# Patient Record
Sex: FEMALE | Race: BLACK OR AFRICAN AMERICAN | Employment: UNEMPLOYED | URBAN - METROPOLITAN AREA
[De-identification: names, ages, dates, MRNs, and addresses within clinical notes are randomized per-mention and may not be internally consistent; named-entity substitution may affect disease eponyms.]

---

## 2019-11-19 ENCOUNTER — OFFICE VISIT (OUTPATIENT)
Dept: FAMILY MEDICINE CLINIC | Age: 39
End: 2019-11-19

## 2019-11-19 ENCOUNTER — HOSPITAL ENCOUNTER (OUTPATIENT)
Dept: LAB | Age: 39
Discharge: HOME | End: 2019-11-19

## 2019-11-19 VITALS
BODY MASS INDEX: 39.24 KG/M2 | HEIGHT: 67 IN | TEMPERATURE: 98.3 F | HEART RATE: 87 BPM | RESPIRATION RATE: 16 BRPM | SYSTOLIC BLOOD PRESSURE: 146 MMHG | WEIGHT: 250 LBS | OXYGEN SATURATION: 99 % | DIASTOLIC BLOOD PRESSURE: 98 MMHG

## 2019-11-19 DIAGNOSIS — D64.9 ANEMIA, UNSPECIFIED TYPE: ICD-10-CM

## 2019-11-19 DIAGNOSIS — I10 ESSENTIAL HYPERTENSION: ICD-10-CM

## 2019-11-19 DIAGNOSIS — E55.9 VITAMIN D DEFICIENCY: ICD-10-CM

## 2019-11-19 DIAGNOSIS — E78.5 HYPERLIPIDEMIA, UNSPECIFIED HYPERLIPIDEMIA TYPE: ICD-10-CM

## 2019-11-19 DIAGNOSIS — R73.01 ABNORMAL FASTING GLUCOSE: ICD-10-CM

## 2019-11-19 DIAGNOSIS — R53.83 FATIGUE, UNSPECIFIED TYPE: ICD-10-CM

## 2019-11-19 DIAGNOSIS — I10 ESSENTIAL HYPERTENSION: Primary | ICD-10-CM

## 2019-11-19 RX ORDER — AMLODIPINE BESYLATE 10 MG/1
TABLET ORAL
Qty: 90 TAB | Refills: 1 | Status: SHIPPED | OUTPATIENT
Start: 2019-11-19 | End: 2020-04-28 | Stop reason: SDUPTHER

## 2019-11-19 RX ORDER — AMLODIPINE BESYLATE 5 MG/1
TABLET ORAL
Refills: 3 | COMMUNITY
Start: 2019-11-10 | End: 2019-11-19 | Stop reason: SDUPTHER

## 2019-11-19 RX ORDER — METOPROLOL SUCCINATE 100 MG/1
TABLET, EXTENDED RELEASE ORAL
Qty: 90 TAB | Refills: 1 | Status: SHIPPED | OUTPATIENT
Start: 2019-11-19 | End: 2020-04-28 | Stop reason: SDUPTHER

## 2019-11-19 RX ORDER — METOPROLOL SUCCINATE 100 MG/1
TABLET, EXTENDED RELEASE ORAL
Refills: 0 | COMMUNITY
Start: 2019-10-23 | End: 2019-11-19 | Stop reason: SDUPTHER

## 2019-11-19 NOTE — PATIENT INSTRUCTIONS
Low Sodium Diet (2,000 Milligram): Care Instructions Your Care Instructions Too much sodium causes your body to hold on to extra water. This can raise your blood pressure and force your heart and kidneys to work harder. In very serious cases, this could cause you to be put in the hospital. It might even be life-threatening. By limiting sodium, you will feel better and lower your risk of serious problems. The most common source of sodium is salt. People get most of the salt in their diet from canned, prepared, and packaged foods. Fast food and restaurant meals also are very high in sodium. Your doctor will probably limit your sodium to less than 2,000 milligrams (mg) a day. This limit counts all the sodium in prepared and packaged foods and any salt you add to your food. Follow-up care is a key part of your treatment and safety. Be sure to make and go to all appointments, and call your doctor if you are having problems. It's also a good idea to know your test results and keep a list of the medicines you take. How can you care for yourself at home? Read food labels · Read labels on cans and food packages. The labels tell you how much sodium is in each serving. Make sure that you look at the serving size. If you eat more than the serving size, you have eaten more sodium. · Food labels also tell you the Percent Daily Value for sodium. Choose products with low Percent Daily Values for sodium. · Be aware that sodium can come in forms other than salt, including monosodium glutamate (MSG), sodium citrate, and sodium bicarbonate (baking soda). MSG is often added to Asian food. When you eat out, you can sometimes ask for food without MSG or added salt. Buy low-sodium foods · Buy foods that are labeled \"unsalted\" (no salt added), \"sodium-free\" (less than 5 mg of sodium per serving), or \"low-sodium\" (less than 140 mg of sodium per serving).  Foods labeled \"reduced-sodium\" and \"light sodium\" may still have too much sodium. Be sure to read the label to see how much sodium you are getting. · Buy fresh vegetables, or frozen vegetables without added sauces. Buy low-sodium versions of canned vegetables, soups, and other canned goods. Prepare low-sodium meals · Cut back on the amount of salt you use in cooking. This will help you adjust to the taste. Do not add salt after cooking. One teaspoon of salt has about 2,300 mg of sodium. · Take the salt shaker off the table. · Flavor your food with garlic, lemon juice, onion, vinegar, herbs, and spices. Do not use soy sauce, lite soy sauce, steak sauce, onion salt, garlic salt, celery salt, mustard, or ketchup on your food. · Use low-sodium salad dressings, sauces, and ketchup. Or make your own salad dressings and sauces without adding salt. · Use less salt (or none) when recipes call for it. You can often use half the salt a recipe calls for without losing flavor. Other foods such as rice, pasta, and grains do not need added salt. · Rinse canned vegetables, and cook them in fresh water. This removes somebut not allof the salt. · Avoid water that is naturally high in sodium or that has been treated with water softeners, which add sodium. Call your local water company to find out the sodium content of your water supply. If you buy bottled water, read the label and choose a sodium-free brand. Avoid high-sodium foods · Avoid eating: 
? Smoked, cured, salted, and canned meat, fish, and poultry. ? Ham, neal, hot dogs, and luncheon meats. ? Regular, hard, and processed cheese and regular peanut butter. ? Crackers with salted tops, and other salted snack foods such as pretzels, chips, and salted popcorn. ? Frozen prepared meals, unless labeled low-sodium. ? Canned and dried soups, broths, and bouillon, unless labeled sodium-free or low-sodium. ? Canned vegetables, unless labeled sodium-free or low-sodium. ? [place] fries, pizza, tacos, and other fast foods. ? Pickles, olives, ketchup, and other condiments, especially soy sauce, unless labeled sodium-free or low-sodium. Where can you learn more? Go to http://jessica-nell.info/. Enter L251 in the search box to learn more about \"Low Sodium Diet (2,000 Milligram): Care Instructions. \" Current as of: November 7, 2018 Content Version: 12.2 © 7940-7927 [employer]. Care instructions adapted under license by [employer] (which disclaims liability or warranty for this information). If you have questions about a medical condition or this instruction, always ask your healthcare professional. [address] any warranty or liability for your use of this information.

## 2019-11-19 NOTE — PROGRESS NOTES
Jason Juarez is a 45 y.o. female who presents today with the following:    HPI  Chief Complaint   Patient presents with   [address]     Pt requesting labs       1. Essential hypertension    HTN  The patient presents today for HTN follow-up. Taking medications daily w/o complications. Home BP readings range from 801U systolic. SIde effects of meds: None  Patient trying to follow low salt diet. Lab Results   Component Value Date/Time    Sodium 136 08/09/2010 11:10 AM    Potassium 4.2 08/09/2010 11:10 AM    Chloride 104 08/09/2010 11:10 AM    CO2 23 08/09/2010 11:10 AM    Anion gap 9 08/09/2010 11:10 AM    Glucose 86 08/09/2010 11:10 AM    BUN 12 08/09/2010 11:10 AM    Creatinine 0.8 08/09/2010 11:10 AM    BUN/Creatinine ratio 15 08/09/2010 11:10 AM    GFR est AA >60 08/09/2010 11:10 AM    GFR est non-AA >60 08/09/2010 11:10 AM    Calcium 8.9 08/09/2010 11:10 AM     No results found for: MCACR, MCA1, MCA2, MCA3, MCAU, MCAU2, MCALPOCT               Review of Systems   Constitutional: Positive for malaise/fatigue. HENT: Negative. Eyes: Negative. Respiratory: Negative. Cardiovascular: Negative. Gastrointestinal: Negative. Genitourinary: Negative. Musculoskeletal: Negative. Skin: Negative. Neurological: Negative. Endo/Heme/Allergies: Negative. Psychiatric/Behavioral: Negative. Physical Exam   Constitutional: She is oriented to person, place, and time and well-developed, well-nourished, and in no distress. HENT:   Head: Normocephalic and atraumatic. Right Ear: External ear normal.   Left Ear: External ear normal.   Nose: Nose normal.   Mouth/Throat: No oropharyngeal exudate. Eyes: Conjunctivae are normal.   Neck: Normal range of motion. Neck supple. No thyromegaly present. Cardiovascular: Normal rate and regular rhythm. Pulmonary/Chest: Effort normal and breath sounds normal.   Abdominal: Soft. Bowel sounds are normal. She exhibits no distension.  There is no tenderness. Musculoskeletal: Normal range of motion. She exhibits no edema. Lymphadenopathy:     She has no cervical adenopathy. Neurological: She is alert and oriented to person, place, and time. Skin: Skin is warm and dry. Psychiatric: Mood and affect normal.   Nursing note and vitals reviewed. BP (!) 146/98   Pulse 87   Temp 98.3 °F (36.8 °C) (Oral)   Resp 16   Ht 5' 7\" (1.702 m)   Wt 250 lb (113.4 kg)   SpO2 99%   BMI 39.16 kg/m²     No Known Allergies    Current Outpatient Medications   Medication Sig    amLODIPine (NORVASC) 10 mg tablet TAKE 1 TABLET BY MOUTH DAILY    metoprolol succinate (TOPROL-XL) 100 mg tablet TAKE 1 TABLET BY MOUTH EVERY DAY    aspirin 81 mg chewable tablet Take 81 mg by mouth daily.  prenatal multivit-ca-min-fe-fa tab Take  by mouth. No current facility-administered medications for this visit. Past Medical History:   Diagnosis Date    Gestational diabetes     Hypertension        Past Surgical History:   Procedure Laterality Date    HX  SECTION         Problem List  Date Reviewed: 2019    None           No results found for this visit on 19.      1. Essential hypertension  Ms. John Koehler has been given the following recommendations today due to her elevated BP reading: rescreen BP within a minimum of 2 weeks and lifestyle modifications to include: dietary sodium restriction. - amLODIPine (NORVASC) 10 mg tablet; TAKE 1 TABLET BY MOUTH DAILY  Dispense: 90 Tab; Refill: 1  - metoprolol succinate (TOPROL-XL) 100 mg tablet; TAKE 1 TABLET BY MOUTH EVERY DAY  Dispense: 90 Tab; Refill: 1  - TSH 3RD GENERATION; Future  - URINALYSIS W/ RFLX MICROSCOPIC; Future  - METABOLIC PANEL, COMPREHENSIVE; Future    2. Abnormal fasting glucose    - HEMOGLOBIN A1C WITH EAG; Future    3. Hyperlipidemia, unspecified hyperlipidemia type    - LIPID PANEL; Future    4. Fatigue, unspecified type    - CBC WITH AUTOMATED DIFF;  Future  - IRON PROFILE; Future  - TSH 3RD GENERATION; Future  - URINALYSIS W/ RFLX MICROSCOPIC; Future  - METABOLIC PANEL, COMPREHENSIVE; Future    5. Anemia, unspecified type    - CBC WITH AUTOMATED DIFF; Future  - IRON PROFILE; Future    6. Vitamin D deficiency    - VITAMIN D, 25 HYDROXY; Future        Follow-up and Dispositions    · Return in about 2 weeks (around 12/3/2019) for follow up, BP. I have discussed the diagnosis with the patient and the intended plan as seen in the above orders. The patient has received an after-visit summary and questions were answered concerning future plans. I have discussed medication side effects and warnings with the patient as well. The patient agrees and understands above plan.            Samara Peña MD

## 2019-11-19 NOTE — PROGRESS NOTES
Identified pt with two pt identifiers(name and ). Reviewed record in preparation for visit and have obtained necessary documentation. Chief Complaint   Patient presents with   [facility] Establish Care    Labs     Pt requesting labs        Health Maintenance Due   Topic    DTaP/Tdap/Td series (1 - Tdap)    PAP AKA CERVICAL CYTOLOGY     Influenza Age 5 to Adult    -Pt declines flu shot    Coordination of Care Questionnaire:  :   1) Have you been to an emergency room, urgent care, or hospitalized since your last visit? If yes, where when, and reason for visit? no      2. Have seen or consulted any other health care provider since your last visit? If yes, where when, and reason for visit?  no        Patient is accompanied by self  I have received verbal consent from Emma Ham to discuss any/all medical information while they are present in the room.

## 2019-11-20 DIAGNOSIS — D64.9 ANEMIA, UNSPECIFIED TYPE: ICD-10-CM

## 2019-11-20 DIAGNOSIS — E55.9 VITAMIN D DEFICIENCY: ICD-10-CM

## 2019-11-20 DIAGNOSIS — N39.0 URINARY TRACT INFECTION WITH HEMATURIA, SITE UNSPECIFIED: ICD-10-CM

## 2019-11-20 DIAGNOSIS — E78.5 HYPERLIPIDEMIA, UNSPECIFIED HYPERLIPIDEMIA TYPE: Primary | ICD-10-CM

## 2019-11-20 DIAGNOSIS — R31.9 URINARY TRACT INFECTION WITH HEMATURIA, SITE UNSPECIFIED: ICD-10-CM

## 2019-11-20 LAB
25(OH)D3 SERPL-MCNC: 12.9 NG/ML (ref 30–100)
ALBUMIN SERPL-MCNC: 3.5 G/DL (ref 3.5–5)
ALBUMIN/GLOB SERPL: 0.8 {RATIO} (ref 1.1–2.2)
ALP SERPL-CCNC: 107 U/L (ref 45–117)
ALT SERPL-CCNC: 21 U/L (ref 12–78)
ANION GAP SERPL CALC-SCNC: 6 MMOL/L (ref 5–15)
APPEARANCE UR: ABNORMAL
AST SERPL-CCNC: 18 U/L (ref 15–37)
BACTERIA URNS QL MICRO: ABNORMAL /HPF
BASOPHILS # BLD: 0 K/UL (ref 0–0.1)
BASOPHILS NFR BLD: 1 % (ref 0–1)
BILIRUB SERPL-MCNC: 0.4 MG/DL (ref 0.2–1)
BILIRUB UR QL: NEGATIVE
BUN SERPL-MCNC: 10 MG/DL (ref 6–20)
BUN/CREAT SERPL: 15 (ref 12–20)
CALCIUM SERPL-MCNC: 8.8 MG/DL (ref 8.5–10.1)
CAOX CRY URNS QL MICRO: ABNORMAL
CHLORIDE SERPL-SCNC: 105 MMOL/L (ref 97–108)
CHOLEST SERPL-MCNC: 204 MG/DL
CO2 SERPL-SCNC: 28 MMOL/L (ref 21–32)
COLOR UR: ABNORMAL
CREAT SERPL-MCNC: 0.67 MG/DL (ref 0.55–1.02)
DIFFERENTIAL METHOD BLD: ABNORMAL
EOSINOPHIL # BLD: 0.3 K/UL (ref 0–0.4)
EOSINOPHIL NFR BLD: 4 % (ref 0–7)
EPITH CASTS URNS QL MICRO: ABNORMAL /LPF
ERYTHROCYTE [DISTWIDTH] IN BLOOD BY AUTOMATED COUNT: 15.9 % (ref 11.5–14.5)
EST. AVERAGE GLUCOSE BLD GHB EST-MCNC: 137 MG/DL
GLOBULIN SER CALC-MCNC: 4.3 G/DL (ref 2–4)
GLUCOSE SERPL-MCNC: 109 MG/DL (ref 65–100)
GLUCOSE UR STRIP.AUTO-MCNC: NEGATIVE MG/DL
HBA1C MFR BLD: 6.4 % (ref 4–5.6)
HCT VFR BLD AUTO: 32.2 % (ref 35–47)
HDLC SERPL-MCNC: 42 MG/DL
HDLC SERPL: 4.9 {RATIO} (ref 0–5)
HGB BLD-MCNC: 9.6 G/DL (ref 11.5–16)
HGB UR QL STRIP: ABNORMAL
IMM GRANULOCYTES # BLD AUTO: 0 K/UL (ref 0–0.04)
IMM GRANULOCYTES NFR BLD AUTO: 0 % (ref 0–0.5)
IRON SATN MFR SERPL: 8 % (ref 20–50)
IRON SERPL-MCNC: 34 UG/DL (ref 35–150)
KETONES UR QL STRIP.AUTO: ABNORMAL MG/DL
LDLC SERPL CALC-MCNC: 143.8 MG/DL (ref 0–100)
LEUKOCYTE ESTERASE UR QL STRIP.AUTO: ABNORMAL
LIPID PROFILE,FLP: ABNORMAL
LYMPHOCYTES # BLD: 2.2 K/UL (ref 0.8–3.5)
LYMPHOCYTES NFR BLD: 34 % (ref 12–49)
MCH RBC QN AUTO: 24.9 PG (ref 26–34)
MCHC RBC AUTO-ENTMCNC: 29.8 G/DL (ref 30–36.5)
MCV RBC AUTO: 83.6 FL (ref 80–99)
MONOCYTES # BLD: 0.4 K/UL (ref 0–1)
MONOCYTES NFR BLD: 7 % (ref 5–13)
NEUTS SEG # BLD: 3.5 K/UL (ref 1.8–8)
NEUTS SEG NFR BLD: 54 % (ref 32–75)
NITRITE UR QL STRIP.AUTO: NEGATIVE
NRBC # BLD: 0 K/UL (ref 0–0.01)
NRBC BLD-RTO: 0 PER 100 WBC
PH UR STRIP: 6.5 [PH] (ref 5–8)
PLATELET # BLD AUTO: 467 K/UL (ref 150–400)
PMV BLD AUTO: 9.8 FL (ref 8.9–12.9)
POTASSIUM SERPL-SCNC: 3.5 MMOL/L (ref 3.5–5.1)
PROT SERPL-MCNC: 7.8 G/DL (ref 6.4–8.2)
PROT UR STRIP-MCNC: 30 MG/DL
RBC # BLD AUTO: 3.85 M/UL (ref 3.8–5.2)
RBC #/AREA URNS HPF: ABNORMAL /HPF (ref 0–5)
SODIUM SERPL-SCNC: 139 MMOL/L (ref 136–145)
SP GR UR REFRACTOMETRY: 1.02 (ref 1–1.03)
TIBC SERPL-MCNC: 446 UG/DL (ref 250–450)
TRIGL SERPL-MCNC: 91 MG/DL (ref ?–150)
TSH SERPL DL<=0.05 MIU/L-ACNC: 0.53 UIU/ML (ref 0.36–3.74)
UROBILINOGEN UR QL STRIP.AUTO: 1 EU/DL (ref 0.2–1)
VLDLC SERPL CALC-MCNC: 18.2 MG/DL
WBC # BLD AUTO: 6.3 K/UL (ref 3.6–11)
WBC URNS QL MICRO: ABNORMAL /HPF (ref 0–4)

## 2019-11-20 RX ORDER — NITROFURANTOIN 25; 75 MG/1; MG/1
100 CAPSULE ORAL 2 TIMES DAILY
Qty: 20 CAP | Refills: 0 | Status: SHIPPED | OUTPATIENT
Start: 2019-11-20 | End: 2019-12-05

## 2019-11-20 RX ORDER — ERGOCALCIFEROL 1.25 MG/1
50000 CAPSULE ORAL
Qty: 12 CAP | Refills: 5 | Status: SHIPPED | OUTPATIENT
Start: 2019-11-20 | End: 2020-04-28 | Stop reason: SDUPTHER

## 2019-11-20 RX ORDER — LANOLIN ALCOHOL/MO/W.PET/CERES
325 CREAM (GRAM) TOPICAL
Qty: 90 TAB | Refills: 5 | Status: SHIPPED | OUTPATIENT
Start: 2019-11-20 | End: 2020-04-28 | Stop reason: SDUPTHER

## 2019-11-20 RX ORDER — ATORVASTATIN CALCIUM 10 MG/1
10 TABLET, FILM COATED ORAL DAILY
Qty: 90 TAB | Refills: 1 | Status: SHIPPED | OUTPATIENT
Start: 2019-11-20 | End: 2019-12-05 | Stop reason: SDUPTHER

## 2019-11-20 NOTE — PROGRESS NOTES
Please call patient, inform, I have faxed rx for cholesterol med,  iron, vitamin d , & antibiotic for uti , to pharmacy on chart. Patient to start taking it & follow up as advised.

## 2019-12-05 ENCOUNTER — OFFICE VISIT (OUTPATIENT)
Dept: FAMILY MEDICINE CLINIC | Age: 39
End: 2019-12-05

## 2019-12-05 VITALS
HEIGHT: 67 IN | WEIGHT: 254 LBS | OXYGEN SATURATION: 98 % | BODY MASS INDEX: 39.87 KG/M2 | TEMPERATURE: 99.7 F | RESPIRATION RATE: 16 BRPM | HEART RATE: 72 BPM | DIASTOLIC BLOOD PRESSURE: 88 MMHG | SYSTOLIC BLOOD PRESSURE: 149 MMHG

## 2019-12-05 DIAGNOSIS — E78.5 HYPERLIPIDEMIA, UNSPECIFIED HYPERLIPIDEMIA TYPE: ICD-10-CM

## 2019-12-05 DIAGNOSIS — I10 ESSENTIAL HYPERTENSION: Primary | ICD-10-CM

## 2019-12-05 DIAGNOSIS — E66.01 SEVERE OBESITY (HCC): ICD-10-CM

## 2019-12-05 RX ORDER — ATORVASTATIN CALCIUM 10 MG/1
10 TABLET, FILM COATED ORAL DAILY
Qty: 90 TAB | Refills: 1 | Status: SHIPPED | OUTPATIENT
Start: 2019-12-05 | End: 2020-04-28 | Stop reason: SDUPTHER

## 2019-12-05 RX ORDER — LISINOPRIL AND HYDROCHLOROTHIAZIDE 10; 12.5 MG/1; MG/1
1 TABLET ORAL DAILY
Qty: 90 TAB | Refills: 1 | Status: SHIPPED | OUTPATIENT
Start: 2019-12-05 | End: 2020-04-28 | Stop reason: SDUPTHER

## 2019-12-05 NOTE — PATIENT INSTRUCTIONS
DASH Diet: Care Instructions Your Care Instructions The DASH diet is an eating plan that can help lower your blood pressure. DASH stands for Dietary Approaches to Stop Hypertension. Hypertension is high blood pressure. The DASH diet focuses on eating foods that are high in calcium, potassium, and magnesium. These nutrients can lower blood pressure. The foods that are highest in these nutrients are fruits, vegetables, low-fat dairy products, nuts, seeds, and legumes. But taking calcium, potassium, and magnesium supplements instead of eating foods that are high in those nutrients does not have the same effect. The DASH diet also includes whole grains, fish, and poultry. The DASH diet is one of several lifestyle changes your doctor may recommend to lower your high blood pressure. Your doctor may also want you to decrease the amount of sodium in your diet. Lowering sodium while following the DASH diet can lower blood pressure even further than just the DASH diet alone. Follow-up care is a key part of your treatment and safety. Be sure to make and go to all appointments, and call your doctor if you are having problems. It's also a good idea to know your test results and keep a list of the medicines you take. How can you care for yourself at home? Following the DASH diet · Eat 4 to 5 servings of fruit each day. A serving is 1 medium-sized piece of fruit, ½ cup chopped or canned fruit, 1/4 cup dried fruit, or 4 ounces (½ cup) of fruit juice. Choose fruit more often than fruit juice. · Eat 4 to 5 servings of vegetables each day. A serving is 1 cup of lettuce or raw leafy vegetables, ½ cup of chopped or cooked vegetables, or 4 ounces (½ cup) of vegetable juice. Choose vegetables more often than vegetable juice. · Get 2 to 3 servings of low-fat and fat-free dairy each day. A serving is 8 ounces of milk, 1 cup of yogurt, or 1 ½ ounces of cheese. · Eat 6 to 8 servings of grains each day. A serving is 1 slice of bread, 1 ounce of dry cereal, or ½ cup of cooked rice, pasta, or cooked cereal. Try to choose whole-grain products as much as possible. · Limit lean meat, poultry, and fish to 2 servings each day. A serving is 3 ounces, about the size of a deck of cards. · Eat 4 to 5 servings of nuts, seeds, and legumes (cooked dried beans, lentils, and split peas) each week. A serving is 1/3 cup of nuts, 2 tablespoons of seeds, or ½ cup of cooked beans or peas. · Limit fats and oils to 2 to 3 servings each day. A serving is 1 teaspoon of vegetable oil or 2 tablespoons of salad dressing. · Limit sweets and added sugars to 5 servings or less a week. A serving is 1 tablespoon jelly or jam, ½ cup sorbet, or 1 cup of lemonade. · Eat less than 2,300 milligrams (mg) of sodium a day. If you limit your sodium to 1,500 mg a day, you can lower your blood pressure even more. Tips for success · Start small. Do not try to make dramatic changes to your diet all at once. You might feel that you are missing out on your favorite foods and then be more likely to not follow the plan. Make small changes, and stick with them. Once those changes become habit, add a few more changes. · Try some of the following: ? Make it a goal to eat a fruit or vegetable at every meal and at snacks. This will make it easy to get the recommended amount of fruits and vegetables each day. ? Try yogurt topped with fruit and nuts for a snack or healthy dessert. ? Add lettuce, tomato, cucumber, and onion to sandwiches. ? Combine a ready-made pizza crust with low-fat mozzarella cheese and lots of vegetable toppings. Try using tomatoes, squash, spinach, broccoli, carrots, cauliflower, and onions. ? Have a variety of cut-up vegetables with a low-fat dip as an appetizer instead of chips and dip. ? Sprinkle sunflower seeds or chopped almonds over salads.  Or try adding chopped walnuts or almonds to cooked vegetables. ? Try some vegetarian meals using beans and peas. Add garbanzo or kidney beans to salads. Make burritos and tacos with mashed gaona beans or black beans. Where can you learn more? Go to http://jessica-nell.info/. Enter A823 in the search box to learn more about \"DASH Diet: Care Instructions. \" Current as of: April 9, 2019 Content Version: 12.2 © 4849-4949 [employer]. Care instructions adapted under license by [employer] (which disclaims liability or warranty for this information). If you have questions about a medical condition or this instruction, always ask your healthcare professional. [address] any warranty or liability for your use of this information. Learning About High Cholesterol What is high cholesterol? Cholesterol is a type of fat in your blood. It is needed for many body functions, such as making new cells. Cholesterol is made by your body. It also comes from food you eat. If you have too much cholesterol, it starts to build up in your arteries. This is called hardening of the arteries, or atherosclerosis. High cholesterol raises your risk of a heart attack and stroke. There are different types of cholesterol. LDL is the \"bad\" cholesterol. High LDL can raise your risk for heart disease, heart attack, and stroke. HDL is the \"good\" cholesterol. High HDL is linked with a lower risk for heart disease, heart attack, and stroke. Your cholesterol levels help your doctor find out your risk for having a heart attack or stroke. How can you prevent high cholesterol? A heart-healthy lifestyle can help you prevent high cholesterol. This lifestyle helps lower your risk for a heart attack and stroke. · Eat heart-healthy foods.  
? Eat fruits, vegetables, whole grains (like oatmeal), dried beans and peas, nuts and seeds, soy products (like tofu), and fat-free or low-fat dairy products. ? Replace butter, margarine, and hydrogenated or partially hydrogenated oils with olive and canola oils. (Canola oil margarine without trans fat is fine.) ? Replace red meat with fish, poultry, and soy protein (like tofu). ? Limit processed and packaged foods like chips, crackers, and cookies. · Be active. Exercise can improve your cholesterol level. Get at least 30 minutes of exercise on most days of the week. Walking is a good choice. You also may want to do other activities, such as running, swimming, cycling, or playing tennis or team sports. · Stay at a healthy weight. Lose weight if you need to. · Don't smoke. If you need help quitting, talk to your doctor about stop-smoking programs and medicines. These can increase your chances of quitting for good. How is high cholesterol treated? The goal of treatment is to reduce your chances of having a heart attack or stroke. The goal is not to lower your cholesterol numbers only. · You may make lifestyle changes, such as eating healthy foods, not smoking, losing weight, and being more active. · You may have to take medicine. Follow-up care is a key part of your treatment and safety. Be sure to make and go to all appointments, and call your doctor if you are having problems. It's also a good idea to know your test results and keep a list of the medicines you take. Where can you learn more? Go to http://jessica-nell.info/. Enter N691 in the search box to learn more about \"Learning About High Cholesterol. \" Current as of: April 9, 2019 Content Version: 12.2 © 8857-4751 [employer]. Care instructions adapted under license by [employer] (which disclaims liability or warranty for this information). If you have questions about a medical condition or this instruction, always ask your healthcare professional. [address] any warranty or liability for your use of this information. Learning About Low-Carbohydrate Diets for Weight Loss What is a low-carbohydrate diet? Low-carb diets avoid foods that are high in carbohydrate. These high-carb foods include pasta, bread, rice, cereal, fruits, and starchy vegetables. Instead, these diets usually have you eat foods that are high in fat and protein. Many people lose weight quickly on a low-carb diet. But the early weight loss is water. People on this diet often gain the weight back after they start eating carbs again. Not all diet plans are safe or work well. A lot of the evidence shows that low-carb diets aren't healthy. That's because these diets often don't include healthy foods like fruits and vegetables. Losing weight safely means balancing protein, fat, and carbs with every meal and snack. And low-carb diets don't always provide the vitamins, minerals, and fiber you need. If you have a serious medical condition, talk to your doctor before you try any diet. These conditions include kidney disease, heart disease, type 2 diabetes, high cholesterol, and high blood pressure. If you are pregnant, it may not be safe for your baby if you are on a low-carb diet. How can you lose weight safely? You might have heard that a diet plan helped another person lose weight. But that doesn't mean that it will work for you. It is very hard to stay on a diet that includes lots of big changes in your eating habits. If you want to get to a healthy weight and stay there, making healthy lifestyle changes will often work better than dieting. These steps can help. · Make a plan for change. Work with your doctor to create a plan that is right for you. · See a dietitian. He or she can show you how to make healthy changes in your eating habits. · Manage stress. If you have a lot of stress in your life, it can be hard to focus on making healthy changes to your daily habits. · Track your food and activity.  You are likely to do better at losing weight if you keep track of what you eat and what you do. Follow-up care is a key part of your treatment and safety. Be sure to make and go to all appointments, and call your doctor if you are having problems. It's also a good idea to know your test results and keep a list of the medicines you take. Where can you learn more? Go to http://jessica-nell.info/. Enter A121 in the search box to learn more about \"Learning About Low-Carbohydrate Diets for Weight Loss. \" Current as of: November 7, 2018 Content Version: 12.2 © 8234-5136 [employer]. Care instructions adapted under license by [employer] (which disclaims liability or warranty for this information). If you have questions about a medical condition or this instruction, always ask your healthcare professional. [address] any warranty or liability for your use of this information.

## 2019-12-05 NOTE — PROGRESS NOTES
Arina Enamorado is a 44 y.o. female who presents today with the following:    HPI  Chief Complaint   Patient presents with    Labs     This roxana follow-up visit        1. Severe obesity ([address]  Counseled patient about weight loss. Gave written information of low-fat, low carbohydrate diet. Recommended exercise. 2. Hyperlipidemia, unspecified hyperlipidemia type  HLD  Patient presents today for hyperlipidemia. Patient currently taking not taking Lipitor although it was faxed to her pharmacy. Taking medication as prescribed without side effects. Trying to follow a low cholesterol diet. Exercising does not exercise  Current medications to control lipids: None  Skips doses of meds: None    Lab Results   Component Value Date/Time    Cholesterol, total 204 (H) 11/19/2019 11:27 AM    HDL Cholesterol 42 11/19/2019 11:27 AM    LDL, calculated 143.8 (H) 11/19/2019 11:27 AM    VLDL, calculated 18.2 11/19/2019 11:27 AM    Triglyceride 91 11/19/2019 11:27 AM    CHOL/HDL Ratio 4.9 11/19/2019 11:27 AM         3. Essential hypertension  Patient is here with her blood pressure log. It shows the patient ranges 170s 150s in the past 2 weeks. Patient reports taking all her medications regularly as prescribed. She does not follow a low-salt diet. She does not exercise. Patient does not have any side effects from current medications. Review of Systems   Constitutional: Positive for malaise/fatigue. HENT: Negative. Eyes: Negative. Respiratory: Negative. Cardiovascular: Negative. Gastrointestinal: Negative. Genitourinary: Negative. Musculoskeletal: Negative. Skin: Negative. Neurological: Negative. Endo/Heme/Allergies: Negative. Psychiatric/Behavioral: Negative. Physical Exam  Vitals signs and nursing note reviewed. HENT:      Head: Normocephalic and atraumatic.       Right Ear: External ear normal.      Left Ear: External ear normal.      Nose: Nose normal.      Mouth/Throat: Pharynx: No oropharyngeal exudate. Eyes:      Conjunctiva/sclera: Conjunctivae normal.   Neck:      Musculoskeletal: Normal range of motion and neck supple. Thyroid: No thyromegaly. Cardiovascular:      Rate and Rhythm: Normal rate and regular rhythm. Pulmonary:      Effort: Pulmonary effort is normal.      Breath sounds: Normal breath sounds. Abdominal:      General: Bowel sounds are normal. There is no distension. Palpations: Abdomen is soft. Tenderness: There is no tenderness. Musculoskeletal: Normal range of motion. Lymphadenopathy:      Cervical: No cervical adenopathy. Skin:     General: Skin is warm and dry. Neurological:      Mental Status: She is alert and oriented to person, place, and time. Psychiatric:         Mood and Affect: Mood and affect normal.       /88   Pulse 72   Temp 99.7 °F (37.6 °C) (Oral)   Resp 16   Ht 5' 7\" (1.702 m)   Wt 254 lb (115.2 kg)   LMP 2019   SpO2 98%   Breastfeeding Unknown   BMI 39.78 kg/m²     No Known Allergies    Current Outpatient Medications   Medication Sig    atorvastatin (LIPITOR) 10 mg tablet Take 1 Tab by mouth daily.  lisinopril-hydroCHLOROthiazide (PRINZIDE, ZESTORETIC) 10-12.5 mg per tablet Take 1 Tab by mouth daily.  ergocalciferol (ERGOCALCIFEROL) 50,000 unit capsule Take 1 Cap by mouth every seven (7) days.  ferrous sulfate 325 mg (65 mg iron) tablet Take 1 Tab by mouth three (3) times daily (with meals).  amLODIPine (NORVASC) 10 mg tablet TAKE 1 TABLET BY MOUTH DAILY    metoprolol succinate (TOPROL-XL) 100 mg tablet TAKE 1 TABLET BY MOUTH EVERY DAY    aspirin 81 mg chewable tablet Take 81 mg by mouth daily.  prenatal multivit-ca-min-fe-fa tab Take  by mouth. No current facility-administered medications for this visit.         Past Medical History:   Diagnosis Date    Gestational diabetes     Hypertension        Past Surgical History:   Procedure Laterality Date    HX  SECTION Problem List  Date Reviewed: 12/5/2019          Codes Class Noted    Severe obesity ([facility] ICD-10-CM: E66.01  ICD-9-CM: 278.01  12/5/2019               No results found for this visit on 12/05/19. 1. Severe obesity ([facility]  I have reviewed/discussed the above normal BMI with the patient and spouse. I have recommended the following interventions: dietary management education, guidance, and counseling, encourage exercise, monitor weight and prescribed dietary intake . Arya Muhammad 2. Hyperlipidemia, unspecified hyperlipidemia type  Patient has not started Lipitor yet. Prescription printed and given again  - atorvastatin (LIPITOR) 10 mg tablet; Take 1 Tab by mouth daily. Dispense: 90 Tab; Refill: 1    3. Essential hypertension  Start lisinopril HCTZ. Ms. Jose Dalton has been given the following recommendations today due to her elevated BP reading: rescreen BP within a minimum of 2 weeks, lifestyle modifications to include: weight reduction, DASH eating plan, dietary sodium restriction and increase physical activity and anti-hypertensive pharmacologic therapy ordered. - lisinopril-hydroCHLOROthiazide (PRINZIDE, ZESTORETIC) 10-12.5 mg per tablet; Take 1 Tab by mouth daily. Dispense: 90 Tab; Refill: 1        Follow-up and Dispositions    · Return in about 1 month (around 1/5/2020) for follow up. I have discussed the diagnosis with the patient and the intended plan as seen in the above orders. The patient has received an after-visit summary and questions were answered concerning future plans. I have discussed medication side effects and warnings with the patient as well. The patient agrees and understands above plan.            Justyn Howell MD

## 2020-04-28 DIAGNOSIS — I10 ESSENTIAL HYPERTENSION: ICD-10-CM

## 2020-04-28 DIAGNOSIS — E78.5 HYPERLIPIDEMIA, UNSPECIFIED HYPERLIPIDEMIA TYPE: ICD-10-CM

## 2020-04-28 DIAGNOSIS — D64.9 ANEMIA, UNSPECIFIED TYPE: ICD-10-CM

## 2020-04-28 DIAGNOSIS — E55.9 VITAMIN D DEFICIENCY: ICD-10-CM

## 2020-04-29 RX ORDER — METOPROLOL SUCCINATE 100 MG/1
TABLET, EXTENDED RELEASE ORAL
Qty: 90 TAB | Refills: 1 | Status: SHIPPED | OUTPATIENT
Start: 2020-04-29

## 2020-04-29 RX ORDER — ATORVASTATIN CALCIUM 10 MG/1
10 TABLET, FILM COATED ORAL DAILY
Qty: 90 TAB | Refills: 1 | Status: SHIPPED | OUTPATIENT
Start: 2020-04-29 | End: 2020-10-12

## 2020-04-29 RX ORDER — LANOLIN ALCOHOL/MO/W.PET/CERES
325 CREAM (GRAM) TOPICAL
Qty: 90 TAB | Refills: 5 | Status: SHIPPED | OUTPATIENT
Start: 2020-04-29 | End: 2020-12-21

## 2020-04-29 RX ORDER — ERGOCALCIFEROL 1.25 MG/1
50000 CAPSULE ORAL
Qty: 12 CAP | Refills: 5 | Status: SHIPPED | OUTPATIENT
Start: 2020-04-29

## 2020-04-29 RX ORDER — LISINOPRIL AND HYDROCHLOROTHIAZIDE 10; 12.5 MG/1; MG/1
1 TABLET ORAL DAILY
Qty: 90 TAB | Refills: 1 | Status: SHIPPED | OUTPATIENT
Start: 2020-04-29

## 2020-04-29 RX ORDER — AMLODIPINE BESYLATE 10 MG/1
TABLET ORAL
Qty: 90 TAB | Refills: 1 | Status: SHIPPED | OUTPATIENT
Start: 2020-04-29

## 2020-11-27 PROBLEM — I10 ESSENTIAL HYPERTENSION: Status: ACTIVE | Noted: 2020-11-27

## 2020-11-27 PROBLEM — E55.9 VITAMIN D DEFICIENCY: Status: ACTIVE | Noted: 2020-11-27

## 2020-11-27 PROBLEM — D50.9 IRON DEFICIENCY ANEMIA: Status: ACTIVE | Noted: 2020-11-27

## 2020-11-27 PROBLEM — E78.2 MIXED HYPERLIPIDEMIA: Status: ACTIVE | Noted: 2020-11-27

## 2020-11-27 PROBLEM — R73.01 ABNORMAL FASTING GLUCOSE: Status: ACTIVE | Noted: 2020-11-27

## 2020-12-19 DIAGNOSIS — D64.9 ANEMIA, UNSPECIFIED TYPE: ICD-10-CM

## 2020-12-21 RX ORDER — LANOLIN ALCOHOL/MO/W.PET/CERES
CREAM (GRAM) TOPICAL
Qty: 270 TAB | Refills: 1 | Status: SHIPPED | OUTPATIENT
Start: 2020-12-21

## 2021-07-02 ENCOUNTER — VIRTUAL VISIT (OUTPATIENT)
Dept: DIABETES SERVICES | Age: 41
End: 2021-07-02
Payer: MEDICAID

## 2021-07-02 DIAGNOSIS — E11.9 TYPE 2 DIABETES MELLITUS WITHOUT COMPLICATION, UNSPECIFIED WHETHER LONG TERM INSULIN USE (HCC): Primary | ICD-10-CM

## 2021-07-02 PROCEDURE — G0108 DIAB MANAGE TRN  PER INDIV: HCPCS

## 2021-07-02 NOTE — PROGRESS NOTES
[facility]  Diabetes Self-Management Education & Support Program  Pre-program Assessment    Reason for Referral:   Referral Source: SINDY Scott*  Services requested: DSMES    ASSESSMENT    From my perspective, the participant would benefit from [facility] specifically related to Reducing risks, Healthy eating, Monitoring, Physical activity, Taking medications, Healthy coping and Problem solving. Will adapt DSMES program to build on participant's skills score, confidence score and preparedness score as noted in the Diabetes Skills, Confidence, and Preparedness Index. During the program, we will focus on providing DSMES that specifically addresses participant's interest in Healthy eating and Monitoring, as shown by their reported readiness to change. The participant would be best served by attending weekly group class series. Diabetes Self-Management Education Follow-up Visit: 7/8/21 at 3:00 PM       Clinical Presentation  Krish Lópze is a 36 y.o.  female referred for diabetes self-management education. Participant has Type 2 DM not on insulin for 1-10 years. Family history positive for diabetes. Patient reports not receiving DSMES services in the past. Most recent A1c value:   Lab Results   Component Value Date/Time    Hemoglobin A1c 6.4 (H) 11/19/2019 11:27 AM     Diabetes-related medications:  Current dosing:   Key Antihyperglycemic Medications     Patient is on no antihyperglycemic meds. Blood Pressure Management  Key ACE/ARB Medications             lisinopril-hydroCHLOROthiazide (PRINZIDE, ZESTORETIC) 10-12.5 mg per tablet Take 1 Tab by mouth daily. Lipid Management  Key Antihyperlipidemia Meds             atorvastatin (LIPITOR) 10 mg tablet TAKE 1 TABLET BY MOUTH EVERY DAY          Clot Prevention  Key Anti-Platelet Anticoagulant Meds             aspirin 81 mg chewable tablet Take 81 mg by mouth daily.           Learning Assessment  Learning objectives Educator assessment (7/2/2021)   Diabetes Disease Process  The participant can   A) describe diabetes in basic terms;   B) state the type of diabetes they have; &   C) state accepted blood glucose targets. Healthy Eating  The participant can   A) identify carbohydrate foods; &   B) accurately read food labels. Being Active  The participant can  A) state the benefits of physical activity;  B) report their current PA practices;  C) identify PA they would consider incorporating in their lives; &  D) develop an implementation plan. Monitoring  The participant can  A) operate their blood glucose meter; &  B) describe how they log their blood glucoses to share with their provider. Taking Medications  The participant can  A) name their diabetes medications;  B) state the purpose and dose;  C) note side effects; &  D) describe proper storage, disposal & transport (if appropriate). Healthy Coping  The participant can    A) describe their response to diabetes diagnosis; B) describe their specific coping mechanisms;  C) identify supportive people and/or other resources that positively support their diabetes self-care and health. Reducing Risks  The participant can describe the preventive measures used by providers to promote health and prevent diabetes complications. Problem Solving  The participant can   A) identify signs, symptoms & treatment of hypoglycemia;   B) identify signs, symptoms & treatment of hyperglycemia;  C) describe their sick day plan; &  D) identify BG patterns to discuss with their provider.        No  No  No        Yes  No        No  Yes  No  No        Yes  Yes          Yes  Yes  Yes  N/A      No  No  Yes        No          No  Yes  No  No     Characteristics to Learning   Barriers to Learning   [] Cognitive loss  [] Mental retardation   [] Intellectual delay/cognitive impairment  [] Psychiatric disorder  [] Visually impaired  [] Hearing loss [] Low literacy (difficulty with written text)  [] Low numeracy (difficulty with mathematical information  [] Low health literacy (difficulty with understanding health information & services  [] Language  [] Functional limitation  [] Pain   [] Financial  [] Transportation  [x] None   Favorite Ways to Learn   [] Lecture  [] Slides  [] Reading [x] Video-Internet  [] Cassettes/CDs/MP3's  [] Interactive Small Groups [] Other       Behavioral Assessment  Current self-care practices  Educator assessment (7/2/2021)   Healthy Eating  Current practices  24-hour Dietary Recall:  Breakfast: Brown noelle bread, mozzarella, egg  Lunch: None  Dinner: Chicken breast, chips  Snacks: None  Beverages: Coffee, orange juice, cinnamon tea  Alcohol: None     Would benefit from DSMES related to Healthy Eating: Yes      Eats a carbohydrate controlled diet: No      Stage of change: Precontemplation   Being Active  Current practices  How many days during the past week have you performed physical activity where your heart beats faster and your breathing is harder than normal for 30 minutes or more?  0 days    How many days in a typical week do you perform activity such as this?  0 days     Would benefit from [facility] related to Being Active: Yes      Exercises 150 minutes/week: No      Stage of change: Precontemplation     Monitoring  Current practices  Do you monitor your blood sugar? Yes    How often do you monitor? <1x/day    What are the range of readings? 125-220 mg/dL    Do you know your last A1c measurement? No    Do you know the meaning of the A1c? No     Would benefit from [facility] related to Monitoring: Yes      Uses BG readings to establish trends and understand BG patterns: No      Stage of change: Precontemplation   Taking Medication  Current practices  Do you understand what your diabetes medications do? Yes    How often do you miss doses of your diabetes medications? Never    Can you afford your diabetes medications?  Yes Would benefit from [facility] related to Taking Medication: Yes      Takes medications consistently to receive full benefit: Yes      Stage of change: Action       Healthy Coping   Current state  Diabetes Skills, Confidence and Preparedness Index: Total score: 5.8  Skills: 5.4  Confidence: 6.0  Preparedness: 6.0   Would benefit from DSMES related to Healthy Coping: Yes      Identifies specific people, organizations,etc, that actively support their diabetes self-care efforts: Yes      Stage of change: Preparation     Reducing Risks  Current state  Vaccines:  Influenza: There is no immunization history on file for this patient. Pneumococcal:   There is no immunization history on file for this patient. Hepatitis:   There is no immunization history on file for this patient. Examinations:  No Diabetic HM Topics for this patient     Dental exam: DUE    Foot exam: DUE    Heart Protection:  BP Readings from Last 2 Encounters:   12/05/19 149/88   11/19/19 (!) 146/98        Lab Results   Component Value Date/Time    LDL, calculated 143.8 (H) 11/19/2019 11:27 AM        Kidney Protection:  No results found for: MCACR, MCA1, MCA2, MCA3, MCAU, MCAU2, MCALPOCT     Would benefit from [facility] related to Reducing Risks: Yes      Actively participates in decision-making with provider regarding secondary prevention:  No      Stage of change: Precontemplation   Problem Solving  Current state  Hypoglycemia Management:  What are signs and symptoms of hypoglycemia that you experience? Pt reported being unaware of s/s of hypoglycemia    How do you prevent hypoglycemia? Pt reported being unaware of how to prevent hypoglycemia    How do you treat hypoglycemia? Pt reported being unaware of how to treat hypoglycemia    Hyperglycemia Management:  What are signs and symptoms of hyperglycemia that you experience? Fatigue, Headache, Dizzy    How can you prevent hyperglycemia?  Take medication as instructed    Sick Day Management:  What do you do differently on sick days? Pt reported being unaware of self-management on sick days    Pattern Management:  Do you notice blood glucose patterns when you look at the readings in your meter or logbook? No    How do you use the blood glucose readings from your meter or logbook? Pt reported being unaware of pattern management skills     Would benefit from [facility] related to Problem Solving: Yes      Articulates appropriate strategies to address hypoglycemia, hyperglycemia, sick day care and BG pattern: No      Stage of change: Precontemplation     Note: Content derived from the American Association of Diabetes Educators' Diabetes Education Curriculum: A Guide to Successful Self-Management (3rd edition)      Pily Adams RN on 7/2/2021 at 3:05 PM    [facility] for Telehealth:  Kari Dinh is a 36 y.o. female being evaluated through a synchronous (real-time) audio-video technology platform, as a substitution for an in-person encounter, to address the healthcare issues mentioned above. I was in the office. The patient was at home. A caregiver was present when appropriate. The patient and/or her healthcare decision maker, is aware that this patient-initiated, Telehealth encounter on 7/2/2021 is a billable service, with coverage as determined by her insurance carrier. She is aware that she may receive a bill and has provided verbal consent to proceed: Yes. This telehealth encounter occurred during the COVID-19 pandemic and public health emergency. Evaluation of the following organ systems was limited: Vitals/Constitutional/EENT/Resp/CV/GI//MS/Neuro/Skin/Heme-Lymph-Imm.   Pursuant to the emergency declaration under the [address] [place] [address], [address] [place] [address] authority and the [employer] and [employer] Act, this Virtual Visit was conducted with patient's (and/or legal guardian's) consent, to reduce the risk of exposure to COVID-19 and provide necessary medical care. Time in appointment: 30 minutes. Diabetes Skills, Confidence & Preparedness Index (SCPI) ©  Thank you for completing the Skills, Confidence & Preparedness Index! Below are your scores. All scales and questions are out of 7. If you would like these results emailed, please enter your email address along with some identifying patient information. Email:    Patient Identifier:        Overall SCPI score: 5.8 Skills Score: 5.4  Low: Reducing Risks(Q5) Confidence Score: 6.0  Low: Healthy Eating(Q1),Healthy Coping(Q2),Reducing Risks(Q3),Healthy Eating(Q4),Being Active(Q5),Blood Sugar Monitoring(Q6),Problem Solving(Q7) Preparedness Score: 6.0  Low: Healthy Eating(Q1),Being Active(Q2),Healthy Coping(Q3),Reducing Risks(Q4),Blood Sugar Monitoring(Q6),Problem Solving(Q7)  Healthy Eating Score: 5.8  Low: Skills(Q1) Taking Medication Score: 6.0  Low: Skills(Q2) Blood Sugar Monitoring Score: 6.0  Low: Skills(Q3),Skills(Q4),Skills(Q8),Confidence(Q6),Preparedness(Q6) Reducing Risks Score: 5.0  Low: Skills(Q5)  Problem Solving Score: 6.0  Low: Skills(Q6),Confidence(Q7),Preparedness(Q7) Healthy Coping Score: 6.0  Low: Skills(Q7),Confidence(Q2),Preparedness(Q3) Being Active Score: 6.0  Low: Confidence(Q5),Preparedness(Q2)    Skills/Knowledge Questions  1. I know how to plan meals that have the best balance between carbohydrates, proteins and vegetables. 5  2. I know how my diabetes medications (pills, injectables and/or insulin) work in my body. 6  3. I know when to check my blood sugar if I want to see how my body responded to a meal. 6  4. I know when to check my blood sugars to determine if my medication or insulin doses are correct. 6  5. I know what to do to prevent a low blood sugar when I exercise (either before, during, or after). 2  6. When I am sick, I know what to do differently with my diabetes management. 6  7.  I know how stress can affect my diabetes management. 6  8. When I look at my blood sugars over a given week, I can explain what my blood sugar pattern is. 6  9. I know what my target levels are for A1c, blood pressure and cholesterol. 6  Confidence Questions  1. I am confident that I can plan balanced meals and snacks. 6  2. I am confident that I can manage my stress. 6  3. I am confident that I can prevent a low blood sugar during or after exercise. 6  4. I am confident that the next time I eat out, I will be able to choose foods that best keep my blood sugars in target. 6  5. I am confident I can include exercise into my schedule. 6  6. I am confident that I can use my daily blood sugars to adjust my diet, my activity, and/or my insulin. 6  7. When something out of my normal routine happens, I am confident that I can problem-solve and keep my diabetes on track. 6  Preparedness Questions  1. Within the next month, I will begin to eat more balanced meals and snacks. 6  2. Within the next month, I will choose an exercise activity and I will start fitting it into my schedule. 6  3. Within the next month, I will make a list of stress management options that work for me. 6  4. Within the next month, I will consistently plan ahead to prevent low blood sugars. 6  5. Within the next month, I will start adjusting my insulin doses on my own. 0  6. Within the next month, I will begin making changes to my diabetes management based on my daily blood sugars (eg - eating, activity and/or insulin). 6  7. Within the next month, I will begin making changes to my diabetes management to meet my overall goals (eg - eating, activity and/or insulin).  6

## 2021-07-08 ENCOUNTER — CLINICAL SUPPORT (OUTPATIENT)
Dept: DIABETES SERVICES | Age: 41
End: 2021-07-08
Payer: COMMERCIAL

## 2021-07-08 DIAGNOSIS — E11.9 TYPE 2 DIABETES MELLITUS WITHOUT COMPLICATION, UNSPECIFIED WHETHER LONG TERM INSULIN USE (HCC): Primary | ICD-10-CM

## 2021-07-08 PROCEDURE — G0109 DIAB MANAGE TRN IND/GROUP: HCPCS | Performed by: DIETITIAN, REGISTERED

## 2021-07-08 NOTE — PROGRESS NOTES
[facility]  Diabetes Self-Management Education & Support Program  Encounter note    SUMMARY  Diabetes self-care management training was completed related to Reducing risks. The participant will return on July 15 to continue DSMES related to Healthy eating and Monitoring. The participant did not identify SMART Goal(s): - no goal set today, and will practice knowledge and skills related to healthy eating and monitoring and being active to improve diabetes self-management. EVALUATION:  Pt is present with her dtr who is doing some translating for her. She continues to decline  help and wishes to stay in the group class. She reports elevated BS but when asked to define \"high\" she is actually in target. Reports 2 low BS but went all day without eating and/or extra activity both times. She will need to review her personal care record for missing pieces     RECOMMENDATIONS:  Review personal care record for gaps in care   Be sure to have some carbs at each meal to prevent low BS    Next provider visit is scheduled for none scheduled           DATE DSMES TOPIC EVALUATION     7/8/2021 WHAT IS DIABETES?   a. Role of the normal pancreas in energy balance and blood glucose control   b. The defect seen in diabetes   c. Signs & symptoms of diabetes   d. Diagnosis of diabetes   e. Types of diabetes   f. Blood glucose targets in non-pregnant & non-pregnant adults       The participant knows   Their type of diabetes Yes   The basic physiologic defect will need to assess understanding since there is a language issue   Blood glucose targets Yes     DATE DSMES TOPIC EVALUATION     7/8/2021 HOW DO I STAY HEALTHY?   a. Prevention    Vaccinations    Preconception care (if applicable)  b.  Examinations    Eye     Dental   Foot   c. Diabetic complications' prevention    Heart health    Kidney Health    [facility] health    Sleep health      The participant has a personal diabetes care record to keep abreast of diabetes health Yes    The participant would benefit from needs to review and identify areas to work on.            Maci Oakley RD , [facility] on 7/8/2021 at 4:52 PM      Time in appointment: 95 minutes

## 2021-07-15 ENCOUNTER — CLINICAL SUPPORT (OUTPATIENT)
Dept: DIABETES SERVICES | Age: 41
End: 2021-07-15
Payer: COMMERCIAL

## 2021-07-15 DIAGNOSIS — E11.9 TYPE 2 DIABETES MELLITUS WITHOUT COMPLICATION, UNSPECIFIED WHETHER LONG TERM INSULIN USE (HCC): Primary | ICD-10-CM

## 2021-07-15 PROCEDURE — G0109 DIAB MANAGE TRN IND/GROUP: HCPCS | Performed by: DIETITIAN, REGISTERED

## 2021-07-15 NOTE — PROGRESS NOTES
[facility]  Diabetes Self-Management Education & Support Program  Encounter note    SUMMARY  Diabetes self-care management training was completed related to Healthy eating and Monitoring. The participant will return on July 22 to continue DSMES related to Physical activity and Taking medications. The participant did identify SMART Goal(s): - to read labels daily for total carbohydrates and not added sugar, and will practice knowledge and skills related to healthy eating and monitoring to improve diabetes self-management. EVALUATION:  Pt is present with her dtr today and she is helping with some interpretation. Pt did ask for info in Japanese and I will gather handouts for her for the next session. Pt has only been focused on added sugars and did not realize effect of all carbs on her BS. She is testing daily and recording results with numbers coming down. RECOMMENDATIONS:  Focus on all sources of carbohydrate in her diet     N             DATE DSMES TOPIC EVALUATION     7/15/2021 WHAT CAN I EAT?   a. General principles   b. Determining a healthy weight   c. Nutritional terms & tools    Healthy Plate method    Carbohydrate Counting    Reading food labels    Free apps   d. Pregnancy recommendations      The participant    Uses Healthy Plate principles in constructing meals No   Reads food labels in choosing acceptable foods No    The participant would benefit from to begin focusing on total carbs and portion size and not focused on added sugar only. DATE DSMES TOPIC EVALUATION     7/15/2021 HOW CAN BLOOD GLUCOSE MONITORING HELP ME?   a. Value of blood glucose monitoring   b. Realistic expectations   c. Blood glucose monitoring targets   d. Target adjustments   e. Setting a1c & blood glucose targets with provider   f. Meter selection    g. Technique for obtaining blood droplet   h. Blood glucose testing sites   i. Determining best times to test   j.  Pregnancy recommendations   k. Data sharing with provider        The participant    Can demonstrate their glucometer procedure Yes   Logs their BG readings Yes    The participant would benefit from continue testing .              Ayanna Ramirez RD , [facility] on 7/15/2021 at 4:49 PM      Time in appointment: 100 minutes

## 2021-07-27 ENCOUNTER — CLINICAL SUPPORT (OUTPATIENT)
Dept: DIABETES SERVICES | Age: 41
End: 2021-07-27
Payer: COMMERCIAL

## 2021-07-27 DIAGNOSIS — E11.9 TYPE 2 DIABETES MELLITUS WITHOUT COMPLICATION, UNSPECIFIED WHETHER LONG TERM INSULIN USE (HCC): Primary | ICD-10-CM

## 2021-07-27 PROCEDURE — G0108 DIAB MANAGE TRN  PER INDIV: HCPCS

## 2021-07-27 NOTE — PROGRESS NOTES
[employer] [facility] Diabetes [facility]  Diabetes Self-Management Education & Support Program  Encounter note    SUMMARY  Diabetes self-care management training was completed related to Physical activity and Taking medications. The participant will return on July 29 to continue DSMES related to Healthy coping and Problem solving. The participant did not identify SMART Goal, and will practice knowledge and skills related to healthy eating and monitoring, being active and medications and problem solving to improve diabetes self-management. EVALUATION:  Participant states that she has an appointment with the sleep specialist for next week. She says she is doing well with her diabetes self management. RECOMMENDATIONS:  Encouraged her to continue to monitor her carbohydrate portions at meals and to monitor her blood sugars daily. SMART GOAL(S)  1. To read labels daily for total carbohydrates for the next 7 days. ACHIEVEMENT OF GOAL(S)  [] 0-24%     [] 25-49%     [x] 50-74%     [] %       DATE DSMES TOPIC EVALUATION     7/27/2021 HOW DO MY DIABETES MEDICATIONS WORK?   a. Type 1 medications & methods    Insulin injections    Injection sites   b. Type 2 medications    Oral agents    GLP-1 agonists   c. Hypoglycemia symptoms & treatment    Glucagon emergency kits   d. General guidance regarding insulin use whether Type 1, 2 or gestational diabetes    Storage of insulin    Disposal     Traveling with medications   e. Barriers to medication adherence      The participant    Can describe the expected action & side effects of prescribed diabetes medications Yes.  Can demonstrate injection technique (if applicable) N/A. DATE DSMES TOPIC EVALUATION     7/27/2021 HOW DOES PHYSICAL ACTIVITY AFFECT MY DIABETES?   a. Benefits of physical activity   b.  Beginning a program of physical activity    Walking    Pedometers    Goal setting   c. Structured physical activity program    Aerobic activity  Resistance    Flexibility    Balance   d. Physical activity program progression   e. Safety issues   f. Barriers to physical activity   g. Facilitators of physical activity        The participant has established a regular physical activity plan Yes    The participant would benefit from continuing to walk and do outdoors work like gardening for her weekly exercise. Kermit Greenberg RN on 7/27/2021 at 12:19 PM    Encounter date: 7/27/2021  Time in appointment: 45 minutes.

## 2021-07-29 ENCOUNTER — CLINICAL SUPPORT (OUTPATIENT)
Dept: DIABETES SERVICES | Age: 41
End: 2021-07-29
Payer: COMMERCIAL

## 2021-07-29 DIAGNOSIS — E11.9 TYPE 2 DIABETES MELLITUS WITHOUT COMPLICATION, UNSPECIFIED WHETHER LONG TERM INSULIN USE (HCC): Primary | ICD-10-CM

## 2021-07-29 PROCEDURE — G0109 DIAB MANAGE TRN IND/GROUP: HCPCS

## 2021-07-29 NOTE — PROGRESS NOTES
[facility] for Diabetes Health  Diabetes Self-Management Education & Support Program  Encounter note    SUMMARY  Diabetes self-care management training was completed related to Healthy coping and Problem solving. The participant will return on September 9 to continue DSMES related to Physical activity and Problem solving. The participant did not identify SMART Goals, and will practice knowledge and skills related to problem solving to improve diabetes self-management. EVALUATION:  Participant states that she does outdoor gardening, and walking to relieve stress. She says she is continuing to work on counting her carbohydrate portions at meal time and reading her food labels. RECOMMENDATIONS:  Encouraged her to schedule her diabetes follow up appointment with her provider. SMART GOAL(S)  1. Participant will count her carrbohydrate portions and read her food labels. ACHIEVEMENT OF GOAL(S)  [] 0-24%     [] 25-49%     [] 50-74%     [x] %     DATE DSMES TOPIC EVALUATION     7/29/2021 HOW DO I FIND SUPPORT TO TACKLE THIS CONDITION?   a. Normal responses to diabetes diagnosis or complication    Shock    Anger & resentment    Guilt/self-blame    Sadness & worry    Depression     Anxiety    Pregnancy   b. Constructive strategies to normal responses     Exploring feelings & attitudes    Motivation: Cost versus benefits analysis    Problem-solving: Chain analysis    Obtaining support: Communicating   i. Family & friends   ii. Health team   iii. Community   c. Stress    Symptoms    Managing stress    Fill your tool kit        The participant can identify people that support them in caring for their diabetes health:  Participant has supportive family members and friends to help her with her diabetes self care management.     The participant would like to pursue the following in keeping themselves healthy after completing the program:  Online reputable resources for continuing her education about diabetes. The participant would benefit from visiting the [employer] website for different examples of eating a healthy plates. DATE DSMES TOPIC EVALUATION     7/29/2021 HOW DO I FIGURE OUT WHAT'S INFLUENCING MY BLOOD GLUCOSES?   a. Problem solving using SOAR    Set goals    Sort options    Arrive at a plan    Reaffirm plan   b. Common problems in diabetes self-care    Hypoglycemia    Hyperglycemia    Sick days   c. Pattern management   d. Disaster preparedness plan        The participant has an action plan for    Hypoglycemia Yes   Hyperglycemia Yes   Sick days Yes   During disasters No    The participant would benefit from making a disaster plan for any emergencies that may happen in the future. Dyan Barth RN on 7/29/2021 at 4:37 PM    Encounter date: 7/29/2021  Time in appointment: 90 minutes.

## 2021-09-09 ENCOUNTER — CLINICAL SUPPORT (OUTPATIENT)
Dept: DIABETES SERVICES | Age: 41
End: 2021-09-09
Payer: COMMERCIAL

## 2021-09-09 DIAGNOSIS — E11.9 TYPE 2 DIABETES MELLITUS WITHOUT COMPLICATION, UNSPECIFIED WHETHER LONG TERM INSULIN USE (HCC): Primary | ICD-10-CM

## 2021-09-09 PROCEDURE — G0108 DIAB MANAGE TRN  PER INDIV: HCPCS

## 2021-09-09 NOTE — PROGRESS NOTES
[employer] [facility]  Diabetes Self-Management Education & Support Program  Post-program Evaluation    EVALUATION @ COMPLETION OF THE PROGRAM    Torin Mobley completed 6 hours of diabetes self-management education. The participant acquired new knowledge and demonstrated new skills during the program.     The participant worked on the following SMART GOAL(s) to improve their diabetes self-management:    1. To read labels daily for total carbohydrates and not added sugars. ACHIEVEMENT OF GOAL(S)  [] 0-24%     [] 25-49%     [x] 50-74%     [] %    The participant's pre-program A1c was   Lab Results   Component Value Date/Time    Hemoglobin A1c 6.4 (H) 11/19/2019 11:27 AM   . The post-evaluation A1c is Needs new labs drawn. The participant improved their Diabetes Skills, Confidence and Preparedness Index (scored out of 7): Total score: 6.9  Skills: 7.0  Confidence: 6.7  Preparedness: 7.0    FINAL RECOMMENDATIONS:  Encouraged the participant to schedule her diabetes follow up appointment with provider and have labs drawn. She says that her fasting blood sugars have improved to 126-130. Charla Richardson RN on 9/9/2021 at 10:50 AM    Metric Patient's responses (9/9/2021) Areas for improvement     Healthy Eating       The participant is using the Healthy Plate to control carbohydrate intake Yes    The participant reads food labels accurately Yes          Being Active       The participant performs physical activity where your heart beats faster and your breathing is harder than normal for 30 minutes or more? 4 days    In a typical week, the participant performs physical activity 4 days          Monitoring   The participant is monitoring their blood sugars? Yes    The participant is monitoring 1x/day    Blood glucoses are ranging:   Breakfast: 120-130 mg/dL    The participant improved their A1c Needs to have new labs drawn.     The participant understands the meaning of the A1c Yes Taking Medications   The participant understands what their diabetes medications do Yes    The participant can afford your diabetes medications Yes    The participant does not miss doses of their diabetes medications 1-3 times per week          Healthy Coping     The participant feels supported by family, friends and others related to their diabetes self-care practices Yes    The participant plans on utilizing the following community resources after completion of the program: Diabetes Online Community        Reducing Risks   Vaccines:  Influenza: There is no immunization history on file for this patient. Pneumococcal:   There is no immunization history on file for this patient. Hepatitis:   There is no immunization history on file for this patient. Examinations:  Diabetic Foot and Eye Exam HM Status   Topic Date Due    Diabetic Foot Care  Never done    Eye Exam  Never done        Dental exam: DUE    Foot exam: DUE    Heart Protection:  BP Readings from Last 2 Encounters:   12/05/19 149/88   11/19/19 (!) 146/98        Lab Results   Component Value Date/Time    LDL, calculated 143.8 (H) 11/19/2019 11:27 AM        Key Anti-Platelet Anticoagulant Meds             aspirin 81 mg chewable tablet Take 81 mg by mouth daily. Kidney Protection:  No results found for: Kaylyn Irving, [facility], [address], [facility], [address] [place] [address], [facility]   The participant would benefit from additional attention to:    Vaccines:  [] Influenza  [] Pneumococcal  [] Hepatitis    Examinations:  [] Dilated eye exam  [] Dental exam  [] Foot exam    Other:  [] Reviewing long-term complications     Problem Solving   Hypoglycemia Management:  The participant knows the signs and symptoms of hypoglycemia Dizziness/light-headedness, Weakness    The participant knows how to prevent hypoglycemia?  Consistent meals/snack times, Take medications as instructed, Monitor blood glucose before exercise and Have food when consuming alcohol    The participant knows how to treat hypoglycemia? Rule of 15    Hyperglycemia Management:  The participant knows their signs and symptoms of hyperglycemia Extreme thirst, Frequent urination, Fatigue, Blurred vision    The participant knows how to prevent hyperglycemia? Take medication as instructed, Focus on carbohydrate counting/meal planning, Engage in regular physically active, Monitor blood glucose    Sick Day Management:  The participant knows what to do differently on sick days? Stay hydrated, Check blood glucose every 2-4 hours, Take diabetes medication as instructed by provider    Pattern Management:  The participant can notice blood glucose patterns when looking at their blood glucose readings Yes           Note: Content derived from the American Association of Diabetes Educators' Diabetes Education Curriculum: A Guide to Successful Self-Management (3rd edition)        Encounter date: 9/9/2021  Time in appointment: 30 minutes. Diabetes Skills, Confidence & Preparedness Index (SCPI) ©  Thank you for completing the Skills, Confidence & Preparedness Index! Below are your scores. All scales and questions are out of 7. If you would like these results emailed, please enter your email address along with some identifying patient information.   Email:    Patient Identifier:        Overall SCPI score: 6.9 Skills Score: 7.0  Low: Healthy Eating(Q1),Taking Medication(Q2),Blood Sugar Monitoring(Q3),Blood Sugar Monitoring(Q4),Reducing Risks(Q5),Problem Solving(Q6),Healthy Coping(Q7),Blood Sugar Monitoring(Q8),Reducing Risks(Q9) Confidence Score: 6.7  Low: Healthy Coping(Q2) Preparedness Score: 7.0  Low: Healthy Eating(Q1),Being Active(Q2),Healthy Coping(Q3),Reducing Risks(Q4),Blood Sugar Monitoring(Q6),Problem Solving(Q7)  Healthy Eating Score: 7.0  Low: Skills(Q1),Confidence(Q1),Confidence(Q4),Preparedness(Q1) Taking Medication Score: 7.0  Low: Skills(Q2) Blood Sugar Monitoring Score: 7.0  Low: Skills(Q3),Skills(Q4),Skills(Q8),Confidence(Q6),Preparedness(Q6) Reducing Risks Score: 7.0  Low: Skills(Q5),Skills(Q9),Confidence(Q3),Preparedness(Q4)  Problem Solving Score: 7.0  Low: Skills(Q6),Confidence(Q7),Preparedness(Q7) Healthy Coping Score: 6.3  Low: Confidence(Q2) Being Active Score: 7.0  Low: Confidence(Q5),Preparedness(Q2)    Skills/Knowledge Questions  1. I know how to plan meals that have the best balance between carbohydrates, proteins and vegetables. 7  2. I know how my diabetes medications (pills, injectables and/or insulin) work in my body. 7  3. I know when to check my blood sugar if I want to see how my body responded to a meal. 7  4. I know when to check my blood sugars to determine if my medication or insulin doses are correct. 7  5. I know what to do to prevent a low blood sugar when I exercise (either before, during, or after). 7  6. When I am sick, I know what to do differently with my diabetes management. 7  7. I know how stress can affect my diabetes management. 7  8. When I look at my blood sugars over a given week, I can explain what my blood sugar pattern is. 7  9. I know what my target levels are for A1c, blood pressure and cholesterol. 7  Confidence Questions  1. I am confident that I can plan balanced meals and snacks. 7  2. I am confident that I can manage my stress. 5  3. I am confident that I can prevent a low blood sugar during or after exercise. 7  4. I am confident that the next time I eat out, I will be able to choose foods that best keep my blood sugars in target. 7  5. I am confident I can include exercise into my schedule. 7  6. I am confident that I can use my daily blood sugars to adjust my diet, my activity, and/or my insulin. 7  7. When something out of my normal routine happens, I am confident that I can problem-solve and keep my diabetes on track. 7  Preparedness Questions  1. Within the next month, I will begin to eat more balanced meals and snacks. 8  2.  Within the next month, I will choose an exercise activity and I will start fitting it into my schedule. 7  3. Within the next month, I will make a list of stress management options that work for me. 7  4. Within the next month, I will consistently plan ahead to prevent low blood sugars. 7  5. Within the next month, I will start adjusting my insulin doses on my own. 0  6. Within the next month, I will begin making changes to my diabetes management based on my daily blood sugars (eg - eating, activity and/or insulin). 7  7. Within the next month, I will begin making changes to my diabetes management to meet my overall goals (eg - eating, activity and/or insulin).  7

## 2022-03-18 PROBLEM — I10 ESSENTIAL HYPERTENSION: Status: ACTIVE | Noted: 2020-11-27

## 2022-03-18 PROBLEM — D50.9 IRON DEFICIENCY ANEMIA: Status: ACTIVE | Noted: 2020-11-27

## 2022-03-18 PROBLEM — E78.2 MIXED HYPERLIPIDEMIA: Status: ACTIVE | Noted: 2020-11-27

## 2022-03-19 PROBLEM — E66.01 SEVERE OBESITY (HCC): Status: ACTIVE | Noted: 2019-12-05

## 2022-03-20 PROBLEM — E55.9 VITAMIN D DEFICIENCY: Status: ACTIVE | Noted: 2020-11-27

## 2022-03-20 PROBLEM — R73.01 ABNORMAL FASTING GLUCOSE: Status: ACTIVE | Noted: 2020-11-27

## 2022-12-06 ENCOUNTER — OFFICE VISIT (OUTPATIENT)
Dept: FAMILY MEDICINE CLINIC | Age: 42
End: 2022-12-06
Payer: COMMERCIAL

## 2022-12-06 VITALS
OXYGEN SATURATION: 100 % | SYSTOLIC BLOOD PRESSURE: 161 MMHG | HEIGHT: 67 IN | DIASTOLIC BLOOD PRESSURE: 105 MMHG | RESPIRATION RATE: 16 BRPM | HEART RATE: 84 BPM | TEMPERATURE: 97.5 F | WEIGHT: 232 LBS | BODY MASS INDEX: 36.41 KG/M2

## 2022-12-06 DIAGNOSIS — I10 ESSENTIAL HYPERTENSION: ICD-10-CM

## 2022-12-06 DIAGNOSIS — N30.01 ACUTE CYSTITIS WITH HEMATURIA: Primary | ICD-10-CM

## 2022-12-06 DIAGNOSIS — E78.5 HYPERLIPIDEMIA, UNSPECIFIED HYPERLIPIDEMIA TYPE: ICD-10-CM

## 2022-12-06 DIAGNOSIS — E11.59 CONTROLLED TYPE 2 DIABETES MELLITUS WITH OTHER CIRCULATORY COMPLICATION, WITHOUT LONG-TERM CURRENT USE OF INSULIN (HCC): ICD-10-CM

## 2022-12-06 DIAGNOSIS — R10.2 PELVIC PAIN: ICD-10-CM

## 2022-12-06 LAB
BILIRUB UR QL STRIP: NEGATIVE
GLUCOSE UR-MCNC: NORMAL MG/DL
KETONES P FAST UR STRIP-MCNC: NEGATIVE MG/DL
PH UR STRIP: 5.5 [PH] (ref 4.6–8)
PROT UR QL STRIP: NEGATIVE
SP GR UR STRIP: 1 (ref 1–1.03)
UA UROBILINOGEN AMB POC: NORMAL (ref 0.2–1)
URINALYSIS CLARITY POC: NORMAL
URINALYSIS COLOR POC: YELLOW
URINE BLOOD POC: NORMAL
URINE LEUKOCYTES POC: NORMAL
URINE NITRITES POC: NEGATIVE

## 2022-12-06 PROCEDURE — 81003 URINALYSIS AUTO W/O SCOPE: CPT | Performed by: NURSE PRACTITIONER

## 2022-12-06 PROCEDURE — 99214 OFFICE O/P EST MOD 30 MIN: CPT | Performed by: NURSE PRACTITIONER

## 2022-12-06 PROCEDURE — 3079F DIAST BP 80-89 MM HG: CPT | Performed by: NURSE PRACTITIONER

## 2022-12-06 PROCEDURE — 3075F SYST BP GE 130 - 139MM HG: CPT | Performed by: NURSE PRACTITIONER

## 2022-12-06 PROCEDURE — 3044F HG A1C LEVEL LT 7.0%: CPT | Performed by: NURSE PRACTITIONER

## 2022-12-06 RX ORDER — SPIRONOLACTONE 25 MG/1
25 TABLET ORAL DAILY
COMMUNITY
Start: 2022-10-26 | End: 2022-12-06 | Stop reason: SDUPTHER

## 2022-12-06 RX ORDER — SPIRONOLACTONE 25 MG/1
25 TABLET ORAL DAILY
Qty: 90 TABLET | Refills: 1 | Status: SHIPPED | OUTPATIENT
Start: 2022-12-06

## 2022-12-06 RX ORDER — AMLODIPINE BESYLATE 10 MG/1
TABLET ORAL
Qty: 90 TABLET | Refills: 1 | Status: SHIPPED | OUTPATIENT
Start: 2022-12-06

## 2022-12-06 RX ORDER — LISINOPRIL 40 MG/1
20 TABLET ORAL DAILY
Qty: 90 TABLET | Refills: 0 | Status: SHIPPED | OUTPATIENT
Start: 2022-12-06

## 2022-12-06 RX ORDER — ATORVASTATIN CALCIUM 10 MG/1
10 TABLET, FILM COATED ORAL DAILY
Qty: 90 TABLET | Refills: 1 | Status: SHIPPED | OUTPATIENT
Start: 2022-12-06

## 2022-12-06 RX ORDER — DAPAGLIFLOZIN 10 MG/1
10 TABLET, FILM COATED ORAL DAILY
COMMUNITY
Start: 2022-11-25

## 2022-12-06 RX ORDER — HYDROCHLOROTHIAZIDE 12.5 MG/1
12.5 TABLET ORAL DAILY
COMMUNITY
Start: 2022-09-22

## 2022-12-06 RX ORDER — NITROFURANTOIN 25; 75 MG/1; MG/1
100 CAPSULE ORAL 2 TIMES DAILY
Qty: 14 CAPSULE | Refills: 0 | Status: SHIPPED | OUTPATIENT
Start: 2022-12-06 | End: 2022-12-15

## 2022-12-06 RX ORDER — LISINOPRIL 40 MG/1
20 TABLET ORAL DAILY
COMMUNITY
Start: 2022-10-26 | End: 2022-12-06 | Stop reason: SDUPTHER

## 2022-12-06 NOTE — PROGRESS NOTES
Identified pt with two pt identifiers(name and ). Reviewed record in preparation for visit and have obtained necessary documentation. Chief Complaint   Patient presents with    Abdominal Pain     For x2 days; experiencing sharp pain         Health Maintenance Due   Topic    Hepatitis C Screening     Depression Screen     COVID-19 Vaccine (1)    Foot Exam Q1     MICROALBUMIN Q1     Eye Exam Retinal or Dilated     Hepatitis B Vaccine (1 of 3 - Risk 3-dose series)    DTaP/Tdap/Td series (1 - Tdap)    Cervical cancer screen     Lipid Screen     Flu Vaccine (1)    A1C test (Diabetic or Prediabetic)        Coordination of Care Questionnaire:  :   1) Have you been to an emergency room, urgent care, or hospitalized since your last visit? If yes, where when, and reason for visit? Yes, [facility] for flu      2. Have seen or consulted any other health care provider since your last visit? If yes, where when, and reason for visit? NO        Patient is accompanied by self, I have received verbal consent from Marchsurya Dougherty to discuss any/all medical information while they are present in the room.

## 2022-12-06 NOTE — PROGRESS NOTES
Assessment/Plan:     Diagnoses and all orders for this visit:    1. Acute cystitis with hematuria  -     CULTURE, URINE; Future  -     nitrofurantoin, macrocrystal-monohydrate, (MACROBID) 100 mg capsule; Take 1 Capsule by mouth two (2) times a day. - Worsening, will treat for acute cystitis today with nitrofurantoin as directed, side effects discussed. Urine culture sent    2. Pelvic pain  -     AMB POC URINALYSIS DIP STICK AUTO W/O MICRO  - UA shows hematuria and 3+ leukocyte Estrace    3. Essential hypertension  -     lisinopriL (PRINIVIL, ZESTRIL) 40 mg tablet; Take 0.5 Tablets by mouth daily. -     amLODIPine (NORVASC) 10 mg tablet; TAKE 1 TABLET BY MOUTH DAILY  -     spironolactone (ALDACTONE) 25 mg tablet; Take 1 Tablet by mouth daily.  - Elevated, check blood pressure at home and follow-up with readings in 4 weeks. Refill today    4. Hyperlipidemia, unspecified hyperlipidemia type  -     atorvastatin (LIPITOR) 10 mg tablet; Take 1 Tablet by mouth daily.  - Presumed stable, refill today    5. Controlled type 2 diabetes mellitus with other circulatory complication, without long-term current use of insulin (HCC)  -     HEMOGLOBIN A1C WITH EAG; Future  -     CBC WITH AUTOMATED DIFF; Future  -     LIPID PANEL; Future  -     METABOLIC PANEL, COMPREHENSIVE; Future  - Presumed stable, patient states this is managed by another practice but will get labs today as requested by patient      Follow-up and Dispositions    Return in about 3 months (around 3/6/2023) for Follow Up. Discussed expected course/resolution/complications of diagnosis in detail with patient. Medication risks/benefits/costs/interactions/alternatives discussed with patient. Pt was given after visit summary which includes diagnoses, current medications & vitals.    Pt expressed understanding with the diagnosis and plan        Subjective:      Joe Francisco is a 43 y.o. female who presents for had concerns including Abdominal Pain (For x2 days; experiencing sharp pain ). Here today for sharp abd/pelvic pain radiates to both sides of abd. Going on for 2 days  Pain is sharp and intermittent all day  Took AZO last night and had some relief    Diabetes  Managed by another practice per patient and     HTN  Elevated today, does not check BP at home. Takes medications as prescribed with no reported side effects    Current Outpatient Medications   Medication Sig Dispense Refill    hydroCHLOROthiazide (HYDRODIURIL) 12.5 mg tablet Take 12.5 mg by mouth daily. Farxiga 10 mg tab tablet Take 10 mg by mouth daily. nitrofurantoin, macrocrystal-monohydrate, (MACROBID) 100 mg capsule Take 1 Capsule by mouth two (2) times a day. 14 Capsule 0    lisinopriL (PRINIVIL, ZESTRIL) 40 mg tablet Take 0.5 Tablets by mouth daily. 90 Tablet 0    amLODIPine (NORVASC) 10 mg tablet TAKE 1 TABLET BY MOUTH DAILY 90 Tablet 1    atorvastatin (LIPITOR) 10 mg tablet Take 1 Tablet by mouth daily. 90 Tablet 1    spironolactone (ALDACTONE) 25 mg tablet Take 1 Tablet by mouth daily. 90 Tablet 1    aspirin 81 mg chewable tablet Take 81 mg by mouth daily. ferrous sulfate 325 mg (65 mg iron) tablet TAKE 1 TABLET BY MOUTH THREE TIMES A DAY WITH FOOD (Patient not taking: Reported on 2022) 270 Tab 1    metoprolol succinate (TOPROL-XL) 100 mg tablet TAKE 1 TABLET BY MOUTH EVERY DAY (Patient not taking: Reported on 2022) 90 Tab 1    ergocalciferol (ERGOCALCIFEROL) 1,250 mcg (50,000 unit) capsule Take 1 Cap by mouth every seven (7) days. (Patient not taking: Reported on 2022) 12 Cap 5    prenatal multivit-ca-min-fe-fa tab Take  by mouth.  (Patient not taking: Reported on 2022)         No Known Allergies  Past Medical History:   Diagnosis Date    Gestational diabetes     Hypertension      Past Surgical History:   Procedure Laterality Date    HX  SECTION       Family History   Problem Relation Age of Onset    Hypertension Mother      Social History Socioeconomic History    Marital status:      Spouse name: Not on file    Number of children: Not on file    Years of education: Not on file    Highest education level: Not on file   Occupational History    Not on file   Tobacco Use    Smoking status: Never    Smokeless tobacco: Never   Vaping Use    Vaping Use: Never used   Substance and Sexual Activity    Alcohol use: No    Drug use: No    Sexual activity: Yes   Other Topics Concern    Not on file   Social History Narrative    Not on file     Social Determinants of Health     Financial Resource Strain: Not on file   Food Insecurity: Not on file   Transportation Needs: Not on file   Physical Activity: Not on file   Stress: Not on file   Social Connections: Not on file   Intimate Partner Violence: Not on file   Housing Stability: Not on file       HPI      ROS:   Review of Systems   Constitutional:  Negative for chills, fever and malaise/fatigue. Eyes:  Negative for blurred vision. Respiratory:  Negative for cough and shortness of breath. Cardiovascular:  Negative for chest pain, palpitations and leg swelling. Genitourinary:  Positive for dysuria. Negative for flank pain, frequency, hematuria and urgency. Pelvic pain   Neurological:  Negative for dizziness and headaches. Objective:   Visit Vitals  BP (!) 161/105 (BP 1 Location: Left upper arm, BP Patient Position: Sitting, BP Cuff Size: Adult long)   Pulse 84   Temp 97.5 °F (36.4 °C) (Temporal)   Resp 16   Ht 5' 7\" (1.702 m)   Wt 232 lb (105.2 kg)   SpO2 100%   BMI 36.34 kg/m²         Vitals and Nurse Documentation reviewed. Physical Exam  Constitutional:       General: She is not in acute distress. Appearance: She is obese. She is not diaphoretic. HENT:      Head: Normocephalic and atraumatic. Cardiovascular:      Rate and Rhythm: Normal rate and regular rhythm. Heart sounds: Normal heart sounds. No murmur heard. No friction rub. No gallop.    Pulmonary: Effort: Pulmonary effort is normal. No respiratory distress. Breath sounds: Normal breath sounds. No wheezing or rales. Abdominal:      Tenderness: There is abdominal tenderness in the suprapubic area. Skin:     General: Skin is warm and dry. Coloration: Skin is not pale. Neurological:      Mental Status: She is alert.        Results for orders placed or performed in visit on 12/06/22   CULTURE, URINE    Specimen: Urine   Result Value Ref Range    Special Requests: NO SPECIAL REQUESTS      [place] Count >100,000  COLONIES/mL        Culture result: ESCHERICHIA COLI (A)         Susceptibility    Escherichia coli - ELLIE     Amikacin ($)*  Susceptible ug/mL      * Susceptible     Ampicillin ($)*  Susceptible ug/mL      * Susceptible     Ampicillin/sulbactam ($)*  Susceptible ug/mL      * Susceptible     Cefazolin ($)*  Susceptible ug/mL      * Susceptible     Cefepime ($$)*  Susceptible ug/mL      * Susceptible     Cefoxitin*  Susceptible ug/mL      * Susceptible     Ceftazidime ($)*  Susceptible ug/mL      * Susceptible     Ceftriaxone ($)*  Susceptible ug/mL      * Susceptible     Ciprofloxacin ($)*  Susceptible ug/mL      * Susceptible     Gentamicin ($)*  Susceptible ug/mL      * Susceptible     Levofloxacin ($)*  Susceptible ug/mL      * Susceptible     Meropenem ($$)*  Susceptible ug/mL      * Susceptible     Nitrofurantoin*  Susceptible ug/mL      * Susceptible     Piperacillin/Tazobac ($)*  Susceptible ug/mL      * Susceptible     Tobramycin ($)*  Susceptible ug/mL      * Susceptible     Trimeth/Sulfa*  Susceptible ug/mL      * Susceptible   METABOLIC PANEL, COMPREHENSIVE   Result Value Ref Range    Sodium 135 (L) 136 - 145 mmol/L    Potassium 4.2 3.5 - 5.1 mmol/L    Chloride 103 97 - 108 mmol/L    CO2 27 21 - 32 mmol/L    Anion gap 5 5 - 15 mmol/L    Glucose 103 (H) 65 - 100 mg/dL    BUN 15 6 - 20 MG/DL    Creatinine 0.80 0.55 - 1.02 MG/DL    BUN/Creatinine ratio 19 12 - 20      eGFR >60 >60 ml/min/1.73m2    Calcium 9.2 8.5 - 10.1 MG/DL    Bilirubin, total 0.4 0.2 - 1.0 MG/DL    ALT (SGPT) 16 12 - 78 U/L    AST (SGOT) 13 (L) 15 - 37 U/L    Alk. phosphatase 101 45 - 117 U/L    Protein, total 8.1 6.4 - 8.2 g/dL    Albumin 3.6 3.5 - 5.0 g/dL    Globulin 4.5 (H) 2.0 - 4.0 g/dL    A-G Ratio 0.8 (L) 1.1 - 2.2     LIPID PANEL   Result Value Ref Range    Cholesterol, total 130 <200 MG/DL    Triglyceride 108 <150 MG/DL    HDL Cholesterol 40 MG/DL    LDL, calculated 68.4 0 - 100 MG/DL    VLDL, calculated 21.6 MG/DL    CHOL/HDL Ratio 3.3 0.0 - 5.0     CBC WITH AUTOMATED DIFF   Result Value Ref Range    WBC 10.3 3.6 - 11.0 K/uL    RBC 3.74 (L) 3.80 - 5.20 M/uL    HGB 8.7 (L) 11.5 - 16.0 g/dL    HCT 29.2 (L) 35.0 - 47.0 %    MCV 78.1 (L) 80.0 - 99.0 FL    MCH 23.3 (L) 26.0 - 34.0 PG    MCHC 29.8 (L) 30.0 - 36.5 g/dL    RDW 16.7 (H) 11.5 - 14.5 %    PLATELET 707 (H) 700 - 400 K/uL    MPV 9.6 8.9 - 12.9 FL    NRBC 0.0 0  WBC    ABSOLUTE NRBC 0.00 0.00 - 0.01 K/uL    NEUTROPHILS 62 32 - 75 %    LYMPHOCYTES 27 12 - 49 %    MONOCYTES 6 5 - 13 %    EOSINOPHILS 4 0 - 7 %    BASOPHILS 1 0 - 1 %    IMMATURE GRANULOCYTES 0 0.0 - 0.5 %    ABS. NEUTROPHILS 6.5 1.8 - 8.0 K/UL    ABS. LYMPHOCYTES 2.8 0.8 - 3.5 K/UL    ABS. MONOCYTES 0.6 0.0 - 1.0 K/UL    ABS. EOSINOPHILS 0.4 0.0 - 0.4 K/UL    ABS. BASOPHILS 0.1 0.0 - 0.1 K/UL    ABS. IMM.  GRANS. 0.0 0.00 - 0.04 K/UL    DF AUTOMATED     HEMOGLOBIN A1C WITH EAG   Result Value Ref Range    Hemoglobin A1c 6.3 (H) 4.0 - 5.6 %    Est. average glucose 134 mg/dL   AMB POC URINALYSIS DIP STICK AUTO W/O MICRO   Result Value Ref Range    Color (UA POC) Yellow     Clarity (UA POC) Cloudy     Glucose (UA POC) Trace Negative    Bilirubin (UA POC) Negative Negative    Ketones (UA POC) Negative Negative    Specific gravity (UA POC) 1.005 1.001 - 1.035    Blood (UA POC) 4+ Negative    pH (UA POC) 5.5 8.0    Protein (UA POC) Negative Negative    Urobilinogen (UA POC) 0.2 mg/dL 0.2 - 1 Nitrites (UA POC) Negative Negative    Leukocyte esterase (UA POC) 3+ Negative

## 2022-12-07 LAB
ALBUMIN SERPL-MCNC: 3.6 G/DL (ref 3.5–5)
ALBUMIN/GLOB SERPL: 0.8 {RATIO} (ref 1.1–2.2)
ALP SERPL-CCNC: 101 U/L (ref 45–117)
ALT SERPL-CCNC: 16 U/L (ref 12–78)
ANION GAP SERPL CALC-SCNC: 5 MMOL/L (ref 5–15)
AST SERPL-CCNC: 13 U/L (ref 15–37)
BASOPHILS # BLD: 0.1 K/UL (ref 0–0.1)
BASOPHILS NFR BLD: 1 % (ref 0–1)
BILIRUB SERPL-MCNC: 0.4 MG/DL (ref 0.2–1)
BUN SERPL-MCNC: 15 MG/DL (ref 6–20)
BUN/CREAT SERPL: 19 (ref 12–20)
CALCIUM SERPL-MCNC: 9.2 MG/DL (ref 8.5–10.1)
CHLORIDE SERPL-SCNC: 103 MMOL/L (ref 97–108)
CHOLEST SERPL-MCNC: 130 MG/DL
CO2 SERPL-SCNC: 27 MMOL/L (ref 21–32)
CREAT SERPL-MCNC: 0.8 MG/DL (ref 0.55–1.02)
DIFFERENTIAL METHOD BLD: ABNORMAL
EOSINOPHIL # BLD: 0.4 K/UL (ref 0–0.4)
EOSINOPHIL NFR BLD: 4 % (ref 0–7)
ERYTHROCYTE [DISTWIDTH] IN BLOOD BY AUTOMATED COUNT: 16.7 % (ref 11.5–14.5)
EST. AVERAGE GLUCOSE BLD GHB EST-MCNC: 134 MG/DL
GLOBULIN SER CALC-MCNC: 4.5 G/DL (ref 2–4)
GLUCOSE SERPL-MCNC: 103 MG/DL (ref 65–100)
HBA1C MFR BLD: 6.3 % (ref 4–5.6)
HCT VFR BLD AUTO: 29.2 % (ref 35–47)
HDLC SERPL-MCNC: 40 MG/DL
HDLC SERPL: 3.3 {RATIO} (ref 0–5)
HGB BLD-MCNC: 8.7 G/DL (ref 11.5–16)
IMM GRANULOCYTES # BLD AUTO: 0 K/UL (ref 0–0.04)
IMM GRANULOCYTES NFR BLD AUTO: 0 % (ref 0–0.5)
LDLC SERPL CALC-MCNC: 68.4 MG/DL (ref 0–100)
LYMPHOCYTES # BLD: 2.8 K/UL (ref 0.8–3.5)
LYMPHOCYTES NFR BLD: 27 % (ref 12–49)
MCH RBC QN AUTO: 23.3 PG (ref 26–34)
MCHC RBC AUTO-ENTMCNC: 29.8 G/DL (ref 30–36.5)
MCV RBC AUTO: 78.1 FL (ref 80–99)
MONOCYTES # BLD: 0.6 K/UL (ref 0–1)
MONOCYTES NFR BLD: 6 % (ref 5–13)
NEUTS SEG # BLD: 6.5 K/UL (ref 1.8–8)
NEUTS SEG NFR BLD: 62 % (ref 32–75)
NRBC # BLD: 0 K/UL (ref 0–0.01)
NRBC BLD-RTO: 0 PER 100 WBC
PLATELET # BLD AUTO: 525 K/UL (ref 150–400)
PMV BLD AUTO: 9.6 FL (ref 8.9–12.9)
POTASSIUM SERPL-SCNC: 4.2 MMOL/L (ref 3.5–5.1)
PROT SERPL-MCNC: 8.1 G/DL (ref 6.4–8.2)
RBC # BLD AUTO: 3.74 M/UL (ref 3.8–5.2)
SODIUM SERPL-SCNC: 135 MMOL/L (ref 136–145)
TRIGL SERPL-MCNC: 108 MG/DL (ref ?–150)
VLDLC SERPL CALC-MCNC: 21.6 MG/DL
WBC # BLD AUTO: 10.3 K/UL (ref 3.6–11)

## 2022-12-09 LAB
BACTERIA SPEC CULT: ABNORMAL
CC UR VC: ABNORMAL
SERVICE CMNT-IMP: ABNORMAL

## 2022-12-12 NOTE — PROGRESS NOTES
Please let patient know her urine culture did grow bacteria and she should be on the proper antibiotic.   She also needs to make a follow-up appointment to discuss some lab work

## 2022-12-15 ENCOUNTER — OFFICE VISIT (OUTPATIENT)
Dept: FAMILY MEDICINE CLINIC | Age: 42
End: 2022-12-15
Payer: COMMERCIAL

## 2022-12-15 VITALS
WEIGHT: 231.2 LBS | DIASTOLIC BLOOD PRESSURE: 88 MMHG | RESPIRATION RATE: 16 BRPM | BODY MASS INDEX: 36.21 KG/M2 | TEMPERATURE: 97.8 F | OXYGEN SATURATION: 99 % | HEART RATE: 88 BPM | SYSTOLIC BLOOD PRESSURE: 130 MMHG

## 2022-12-15 DIAGNOSIS — Z53.20 PAP SMEAR OF CERVIX DECLINED: ICD-10-CM

## 2022-12-15 DIAGNOSIS — E55.9 VITAMIN D DEFICIENCY: ICD-10-CM

## 2022-12-15 DIAGNOSIS — D50.9 IRON DEFICIENCY ANEMIA, UNSPECIFIED IRON DEFICIENCY ANEMIA TYPE: ICD-10-CM

## 2022-12-15 DIAGNOSIS — Z71.2 ENCOUNTER TO DISCUSS TEST RESULTS: ICD-10-CM

## 2022-12-15 DIAGNOSIS — N92.0 MENORRHAGIA WITH REGULAR CYCLE: ICD-10-CM

## 2022-12-15 DIAGNOSIS — Z12.31 ENCOUNTER FOR SCREENING MAMMOGRAM FOR MALIGNANT NEOPLASM OF BREAST: ICD-10-CM

## 2022-12-15 DIAGNOSIS — E11.59 CONTROLLED TYPE 2 DIABETES MELLITUS WITH OTHER CIRCULATORY COMPLICATION, WITHOUT LONG-TERM CURRENT USE OF INSULIN (HCC): Primary | ICD-10-CM

## 2022-12-15 DIAGNOSIS — Z11.59 ENCOUNTER FOR HEPATITIS C SCREENING TEST FOR LOW RISK PATIENT: ICD-10-CM

## 2022-12-15 PROBLEM — E11.9 DIABETES MELLITUS TYPE II, CONTROLLED (HCC): Status: ACTIVE | Noted: 2022-12-15

## 2022-12-15 RX ORDER — IBUPROFEN 200 MG
CAPSULE ORAL
Qty: 100 STRIP | Refills: 11 | Status: SHIPPED | OUTPATIENT
Start: 2022-12-15

## 2022-12-15 NOTE — PROGRESS NOTES
Steve Sandoval is a 43 y.o. female who presents today     LMP: 2 weeks ago. Menses: Heavy. Last pelvic/PAP: Unknown. Last mammogram: Unknown. Last BMD: Never. Last screening colonoscopy: Never. Trying to eat a well balanced diet. Patient declines immunizations, breast exam and Pap smear today. All recent lab results discussed in detail with the patient today. There is no immunization history on file for this patient.,   There is no immunization history on file for this patient. ,       Current Outpatient Medications   Medication Sig Dispense Refill    Ferrous Fumarate 325 mg (106 mg iron) tab Take 325 mg by mouth daily. Indications: anemia from inadequate iron 90 Tablet 3    glucose blood VI test strips (blood glucose test) strip Check blood glucose fasting daily 100 Strip 11    hydroCHLOROthiazide (HYDRODIURIL) 12.5 mg tablet Take 12.5 mg by mouth daily. Farxiga 10 mg tab tablet Take 10 mg by mouth daily. lisinopriL (PRINIVIL, ZESTRIL) 40 mg tablet Take 0.5 Tablets by mouth daily. 90 Tablet 0    amLODIPine (NORVASC) 10 mg tablet TAKE 1 TABLET BY MOUTH DAILY 90 Tablet 1    atorvastatin (LIPITOR) 10 mg tablet Take 1 Tablet by mouth daily. 90 Tablet 1    spironolactone (ALDACTONE) 25 mg tablet Take 1 Tablet by mouth daily. 90 Tablet 1    aspirin 81 mg chewable tablet Take 81 mg by mouth daily. metoprolol succinate (TOPROL-XL) 100 mg tablet TAKE 1 TABLET BY MOUTH EVERY DAY (Patient not taking: No sig reported) 90 Tab 1    ergocalciferol (ERGOCALCIFEROL) 1,250 mcg (50,000 unit) capsule Take 1 Cap by mouth every seven (7) days. (Patient not taking: No sig reported) 12 Cap 5    prenatal multivit-ca-min-fe-fa tab Take  by mouth. (Patient not taking: No sig reported)       Allergies: Patient has no known allergies.    Social History     Socioeconomic History    Marital status:      Spouse name: Not on file    Number of children: Not on file    Years of education: Not on file    Highest education level: Not on file   Occupational History    Not on file   Tobacco Use    Smoking status: Never    Smokeless tobacco: Never   Vaping Use    Vaping Use: Never used   Substance and Sexual Activity    Alcohol use: No    Drug use: No    Sexual activity: Yes   Other Topics Concern    Not on file   Social History Narrative    Not on file     Social Determinants of Health     Financial Resource Strain: Not on file   Food Insecurity: Not on file   Transportation Needs: Not on file   Physical Activity: Not on file   Stress: Not on file   Social Connections: Not on file   Intimate Partner Violence: Not on file   Housing Stability: Not on file     Family History   Problem Relation Age of Onset    Hypertension Mother      Past Medical History:   Diagnosis Date    Gestational diabetes     Hypertension        Problem List  Date Reviewed: 12/15/2022            Codes Class Noted    Diabetes mellitus type II, controlled ([facility] ICD-10-CM: E11.9  ICD-9-CM: 250.00  12/15/2022        Menorrhagia with regular cycle ICD-10-CM: N92.0  ICD-9-CM: 626.2  12/15/2022        Essential hypertension ICD-10-CM: I10  ICD-9-CM: 401.9  11/27/2020        Mixed hyperlipidemia ICD-10-CM: E78.2  ICD-9-CM: 272.2  11/27/2020        Vitamin D deficiency ICD-10-CM: E55.9  ICD-9-CM: 268.9  11/27/2020        Iron deficiency anemia ICD-10-CM: D50.9  ICD-9-CM: 280.9  11/27/2020        Abnormal fasting glucose ICD-10-CM: R73.01  ICD-9-CM: 790.29  11/27/2020        Severe obesity ([facility] ICD-10-CM: E66.01  ICD-9-CM: 278.01  12/5/2019           Patient Care Team:  Jewel Sherwood MD as PCP - General (Family Medicine)  Jewel Sherwood MD as PCP - [facility] Provider    Review of Systems   Constitutional: Negative. HENT: Negative. Eyes: Negative. Respiratory: Negative. Cardiovascular: Negative. Gastrointestinal: Negative. Genitourinary: Negative. Musculoskeletal: Negative. Skin: Negative. Neurological: Negative.     Endo/Heme/Allergies: Negative. Psychiatric/Behavioral: Negative. Physical Exam  Constitutional:       Appearance: Normal appearance. HENT:      Right Ear: Tympanic membrane, ear canal and external ear normal.      Left Ear: Tympanic membrane, ear canal and external ear normal.      Mouth/Throat:      Pharynx: No oropharyngeal exudate. Eyes:      Extraocular Movements: Extraocular movements intact. Conjunctiva/sclera: Conjunctivae normal.      Pupils: Pupils are equal, round, and reactive to light. Neck:      Thyroid: No thyromegaly. Cardiovascular:      Rate and Rhythm: Normal rate and regular rhythm. Pulmonary:      Effort: Pulmonary effort is normal.      Breath sounds: Normal breath sounds. Abdominal:      General: Bowel sounds are normal. There is no distension. Palpations: Abdomen is soft. Tenderness: There is no abdominal tenderness. Musculoskeletal:         General: Normal range of motion. Cervical back: Normal range of motion and neck supple. Right lower leg: No edema. Left lower leg: No edema. Lymphadenopathy:      Cervical: No cervical adenopathy. Skin:     General: Skin is warm and dry. Neurological:      General: No focal deficit present. Mental Status: She is alert and oriented to person, place, and time. Mental status is at baseline. Psychiatric:         Mood and Affect: Mood normal.         Behavior: Behavior normal.          Assessment/plan    1. Controlled type 2 diabetes mellitus with other circulatory complication, without long-term current use of insulin (HCC)  Currently taking [place] which is prescribed by her cardiologist at [facility]  - glucose blood VI test strips (blood glucose test) strip; Check blood glucose fasting daily  Dispense: 100 Strip; Refill: 11    2. Iron deficiency anemia, unspecified iron deficiency anemia type    - Ferrous Fumarate 325 mg (106 mg iron) tab;  Take 325 mg by mouth daily. Indications: anemia from inadequate iron  Dispense: 90 Tablet; Refill: 3  - IRON PROFILE; Future    3. Menorrhagia with regular cycle  Patient reports heavy menstrual cycle with clotting. She is currently anemic. Not taking iron supplement. I have advised her to start taking iron supplement and make appointment with OB/GYN for further evaluation.  - REFERRAL TO OBSTETRICS AND GYNECOLOGY    4. Encounter for screening mammogram for malignant neoplasm of breast  Declined breast exam today  - [place] MAMMO BI SCREENING INCL CAD; Future      Declines immunizations today    6. Vitamin D deficiency    - VITAMIN D, 25 HYDROXY; Future    7. Encounter for hepatitis C screening test for low risk patient    - HEPATITIS C AB; Future    8. Pap smear of cervix declined  I discussed with patient to get Pap smear done. She prefers to get Pap smear scheduled with OB/GYN. Referral given. 43 y.o. female who presents today for annual exam. UTD on screening. Not UTD on vaccines. Will check routine labs. Encouraged daily exercise and trying to eat a well balanced diet. I have discussed the diagnosis with the patient and the intended plan as seen in the above orders. The patient has received an after-visit summary and questions were answered concerning future plans. I have discussed medication side effects and warnings with the patient as well. The patient agrees and understands above plan. Follow-up and Dispositions    Return in about 3 months (around 3/15/2023) for follow up.            Jacidna Zheng MD

## 2022-12-15 NOTE — PROGRESS NOTES
1. Have you been to the ER, urgent care clinic since your last visit? Hospitalized since your last visit? Yes   [facility]: Positive for Flu     2. Have you seen or consulted any other health care providers outside of the [address] [place] [address] since your last visit? Include any pap smears or colon screening.  No          Chief Complaint   Patient presents with    Physical Bexarotene Counseling:  I discussed with the patient the risks of bexarotene including but not limited to hair loss, dry lips/skin/eyes, liver abnormalities, hyperlipidemia, pancreatitis, depression/suicidal ideation, photosensitivity, drug rash/allergic reactions, hypothyroidism, anemia, leukopenia, infection, cataracts, and teratogenicity.  Patient understands that they will need regular blood tests to check lipid profile, liver function tests, white blood cell count, thyroid function tests and pregnancy test if applicable.

## 2022-12-29 ENCOUNTER — TELEPHONE (OUTPATIENT)
Dept: FAMILY MEDICINE CLINIC | Age: 42
End: 2022-12-29

## 2022-12-29 DIAGNOSIS — E11.59 CONTROLLED TYPE 2 DIABETES MELLITUS WITH OTHER CIRCULATORY COMPLICATION, WITHOUT LONG-TERM CURRENT USE OF INSULIN (HCC): Primary | ICD-10-CM

## 2022-12-29 DIAGNOSIS — D50.9 IRON DEFICIENCY ANEMIA, UNSPECIFIED IRON DEFICIENCY ANEMIA TYPE: ICD-10-CM

## 2022-12-29 NOTE — TELEPHONE ENCOUNTER
Called and spoke with patient's  regarding Prior Auth for the OneTouch Ultra Strips and that after talking with [employer] they states [employer] is an alternative. Will inform Dr. Heriberto Craig When she comes back next Tuesday.

## 2023-01-11 RX ORDER — FERROUS FUMARATE 324(106)MG
324 TABLET ORAL DAILY
Qty: 90 TABLET | Refills: 3 | Status: SHIPPED | OUTPATIENT
Start: 2023-01-11

## 2023-01-11 RX ORDER — IBUPROFEN 200 MG
CAPSULE ORAL
Qty: 100 STRIP | Refills: 11 | Status: SHIPPED | OUTPATIENT
Start: 2023-01-11

## 2023-03-15 ENCOUNTER — OFFICE VISIT (OUTPATIENT)
Dept: FAMILY MEDICINE CLINIC | Age: 43
End: 2023-03-15
Payer: COMMERCIAL

## 2023-03-15 VITALS
SYSTOLIC BLOOD PRESSURE: 135 MMHG | WEIGHT: 239.8 LBS | RESPIRATION RATE: 16 BRPM | BODY MASS INDEX: 37.64 KG/M2 | HEIGHT: 67 IN | OXYGEN SATURATION: 100 % | DIASTOLIC BLOOD PRESSURE: 87 MMHG | TEMPERATURE: 98 F | HEART RATE: 84 BPM

## 2023-03-15 DIAGNOSIS — I10 ESSENTIAL HYPERTENSION: ICD-10-CM

## 2023-03-15 DIAGNOSIS — R73.01 IFG (IMPAIRED FASTING GLUCOSE): ICD-10-CM

## 2023-03-15 DIAGNOSIS — E55.9 VITAMIN D DEFICIENCY: ICD-10-CM

## 2023-03-15 DIAGNOSIS — Z12.31 SCREENING MAMMOGRAM, ENCOUNTER FOR: ICD-10-CM

## 2023-03-15 DIAGNOSIS — E78.2 MIXED HYPERLIPIDEMIA: ICD-10-CM

## 2023-03-15 DIAGNOSIS — Z12.11 SCREEN FOR COLON CANCER: ICD-10-CM

## 2023-03-15 DIAGNOSIS — E11.59 CONTROLLED TYPE 2 DIABETES MELLITUS WITH OTHER CIRCULATORY COMPLICATION, WITHOUT LONG-TERM CURRENT USE OF INSULIN (HCC): ICD-10-CM

## 2023-03-15 DIAGNOSIS — E78.5 HYPERLIPIDEMIA, UNSPECIFIED HYPERLIPIDEMIA TYPE: ICD-10-CM

## 2023-03-15 DIAGNOSIS — Z11.59 ENCOUNTER FOR HEPATITIS C SCREENING TEST FOR LOW RISK PATIENT: ICD-10-CM

## 2023-03-15 DIAGNOSIS — N92.1 MENOMETRORRHAGIA: ICD-10-CM

## 2023-03-15 DIAGNOSIS — R35.0 URINE FREQUENCY: Primary | ICD-10-CM

## 2023-03-15 DIAGNOSIS — M25.473 MILD ANKLE EDEMA: ICD-10-CM

## 2023-03-15 DIAGNOSIS — D50.9 IRON DEFICIENCY ANEMIA, UNSPECIFIED IRON DEFICIENCY ANEMIA TYPE: ICD-10-CM

## 2023-03-15 LAB
BILIRUB UR QL STRIP: NEGATIVE
GLUCOSE UR-MCNC: NORMAL MG/DL
KETONES P FAST UR STRIP-MCNC: NEGATIVE MG/DL
PH UR STRIP: 6 [PH] (ref 4.6–8)
PROT UR QL STRIP: NORMAL
SP GR UR STRIP: 1.01 (ref 1–1.03)
UA UROBILINOGEN AMB POC: NORMAL (ref 0.2–1)
URINALYSIS CLARITY POC: NORMAL
URINALYSIS COLOR POC: NORMAL
URINE BLOOD POC: NORMAL
URINE LEUKOCYTES POC: NEGATIVE
URINE NITRITES POC: NEGATIVE

## 2023-03-15 PROCEDURE — 3079F DIAST BP 80-89 MM HG: CPT | Performed by: FAMILY MEDICINE

## 2023-03-15 PROCEDURE — 99214 OFFICE O/P EST MOD 30 MIN: CPT | Performed by: FAMILY MEDICINE

## 2023-03-15 PROCEDURE — 81002 URINALYSIS NONAUTO W/O SCOPE: CPT | Performed by: FAMILY MEDICINE

## 2023-03-15 PROCEDURE — 3075F SYST BP GE 130 - 139MM HG: CPT | Performed by: FAMILY MEDICINE

## 2023-03-15 RX ORDER — LISINOPRIL 40 MG/1
20 TABLET ORAL DAILY
Qty: 90 TABLET | Refills: 0 | Status: SHIPPED | OUTPATIENT
Start: 2023-03-15

## 2023-03-15 RX ORDER — ATORVASTATIN CALCIUM 20 MG/1
20 TABLET, FILM COATED ORAL DAILY
Qty: 90 TABLET | Refills: 1 | Status: SHIPPED | OUTPATIENT
Start: 2023-03-15

## 2023-03-15 RX ORDER — HYDROCHLOROTHIAZIDE 12.5 MG/1
12.5 TABLET ORAL
Qty: 30 TABLET | Refills: 1 | Status: SHIPPED | OUTPATIENT
Start: 2023-03-15

## 2023-03-15 RX ORDER — FERROUS FUMARATE 324(106)MG
324 TABLET ORAL DAILY
Qty: 90 TABLET | Refills: 3 | Status: SHIPPED | OUTPATIENT
Start: 2023-03-15

## 2023-03-15 RX ORDER — IBUPROFEN 200 MG
CAPSULE ORAL
Qty: 100 STRIP | Refills: 11 | Status: SHIPPED | OUTPATIENT
Start: 2023-03-15

## 2023-03-15 RX ORDER — AMLODIPINE BESYLATE 10 MG/1
TABLET ORAL
Qty: 90 TABLET | Refills: 1 | Status: SHIPPED | OUTPATIENT
Start: 2023-03-15

## 2023-03-15 RX ORDER — SPIRONOLACTONE 25 MG/1
25 TABLET ORAL DAILY
Qty: 90 TABLET | Refills: 1 | Status: SHIPPED | OUTPATIENT
Start: 2023-03-15

## 2023-03-15 NOTE — PROGRESS NOTES
1. Have you been to the ER, urgent care clinic since your last visit? Hospitalized since your last visit? No    2. Have you seen or consulted any other health care providers outside of the [address] [place] [address] since your last visit? Include any pap smears or colon screening.  No          Chief Complaint   Patient presents with    Follow-up    Diabetes    Urinary Frequency     X1 week now   No pain when urinating

## 2023-03-15 NOTE — PROGRESS NOTES
3/15/2023   Gia Sher (: 1980) is a 43 y.o. female, established patient, here for evaluation of the following chief complaint(s):  Follow-up, Diabetes, and Urinary Frequency (X1 week now   No pain when urinating )     ASSESSMENT/PLAN:  Below is the assessment and plan developed based on review of pertinent history, physical exam, labs, studies, and medications. 1. Urine frequency  -     AMB POC URINALYSIS DIP STICK MANUAL W/O MICRO  -     CULTURE, URINE; Future  2. Menometrorrhagia  -     REFERRAL TO OBSTETRICS AND GYNECOLOGY  -     US PELV NON OBS; Future  -     IRON PROFILE; Future  -     THYROID CASCADE PROFILE; Future  -     CBC WITH AUTOMATED DIFF; Future  -     METABOLIC PANEL, COMPREHENSIVE; Future  3. Screening mammogram, encounter for  -     [facility] MAMMO BI SCREENING INCL CAD; Future  4. Iron deficiency anemia, unspecified iron deficiency anemia type  -     ferrous fumarate 324 mg (106 mg iron) tab; Take 324 mg by mouth daily. , Print, Disp-90 Tablet, R-3  -     IRON PROFILE; Future  -     CBC WITH AUTOMATED DIFF; Future  -     OCCULT BLOOD IMMUNOASSAY,DIAGNOSTIC; Future  5. Controlled type 2 diabetes mellitus with other circulatory complication, without long-term current use of insulin (HCC)  -     glucose blood VI test strips (blood glucose test) strip; Check blood glucose fasting daily. TRUE METRIX TEST STRIPS, Normal, Disp-100 Strip, R-11  -     HEMOGLOBIN A1C WITH EAG; Future  -     MICROALBUMIN, UR, RAND W/ MICROALB/CREAT RATIO; Future  -     CBC WITH AUTOMATED DIFF; Future  -     METABOLIC PANEL, COMPREHENSIVE; Future  6. Essential hypertension  -     lisinopriL (PRINIVIL, ZESTRIL) 40 mg tablet; Take 0.5 Tablets by mouth daily. , Normal, Disp-90 Tablet, R-0  -     amLODIPine (NORVASC) 10 mg tablet; TAKE 1 TABLET BY MOUTH DAILY, Normal, Disp-90 Tablet, R-1  -     spironolactone (ALDACTONE) 25 mg tablet; Take 1 Tablet by mouth daily. , Normal, Disp-90 Tablet, R-1  -     THYROID CASCADE PROFILE; Future  -     MICROALBUMIN, UR, RAND W/ MICROALB/CREAT RATIO; Future  -     METABOLIC PANEL, COMPREHENSIVE; Future  7. Hyperlipidemia, unspecified hyperlipidemia type  -     atorvastatin (LIPITOR) 20 mg tablet; Take 1 Tablet by mouth daily. , Normal, Disp-90 Tablet, R-1  8. Mixed hyperlipidemia  -     METABOLIC PANEL, COMPREHENSIVE; Future  -     LIPID PANEL; Future  9. IFG (impaired fasting glucose)  10. Encounter for hepatitis C screening test for low risk patient  -     HEPATITIS C AB; Future  11. Vitamin D deficiency  -     VITAMIN D, 25 HYDROXY; Future  12. Screen for colon cancer  -     OCCULT BLOOD IMMUNOASSAY,DIAGNOSTIC; Future  13. Mild ankle edema  -     hydroCHLOROthiazide (HYDRODIURIL) 12.5 mg tablet; Take 1 Tablet by mouth daily as needed (SWELLING). , Normal, Disp-30 Tablet, R-1    Return in about 2 weeks (around 3/29/2023) for follow up, discuss labs. SUBJECTIVE/OBJECTIVE:  HPI     1. Urine frequency  Patient reports increased urinary frequency. Urinalysis shows glucosuria. I discussed with her her symptoms could be due to glucosuria. I will send urine for culture. Denies any dysuria fever chills nausea vomiting or abdominal pain. 2. Menometrorrhagia  Patient reports heavy painful menstrual cycle. She is not up-to-date on Pap smear. I will refer to OB/GYN. I will check pelvic ultrasound. I have advised her to start iron supplement. 3. Screening mammogram, encounter for  Mammogram order placed  4. Iron deficiency anemia, unspecified iron deficiency anemia type  She is currently not taking iron supplement. Prescription printed and given and handed to the patient. Check labs. 5. Controlled type 2 diabetes mellitus with other circulatory complication, without long-term current use of insulin ([address]  Patient is currently taking [place]. This is prescribed by her cardiologist.  Check labs    6. Essential hypertension  Ms. Eva Correia has been given the following recommendations today due to her elevated BP reading: rescreen BP within a minimum of 2 weeks and lifestyle modifications to include: dietary sodium restriction. 7. Hyperlipidemia, unspecified hyperlipidemia type  Currently taking Lipitor 20 mg daily. Check labs    8. Mixed hyperlipidemia  Check labs    9. IFG (impaired fasting glucose)  Check labs    10. Encounter for hepatitis C screening test for low risk patient  Screen for hep C    11. Vitamin D deficiency  Check vitamin D level    12. Screen for colon cancer  Patient has iron deficiency anemia. I will check stool test    13. Mild ankle edema  Advised to start HCTZ only as needed for ankle swelling       Results for orders placed or performed in visit on 03/15/23   AMB POC URINALYSIS DIP STICK MANUAL W/O MICRO   Result Value Ref Range    Color (UA POC)      Clarity (UA POC)      Glucose (UA POC) 3+ Negative    Bilirubin (UA POC) Negative Negative    Ketones (UA POC) Negative Negative    Specific gravity (UA POC) 1.015 1.001 - 1.035    Blood (UA POC) 3+ Negative    pH (UA POC) 6.0 4.6 - 8.0    Protein (UA POC) 1+ Negative    Urobilinogen (UA POC) 0.2 mg/dL 0.2 - 1    Nitrites (UA POC) Negative Negative    Leukocyte esterase (UA POC) Negative Negative                Review of Systems   Constitutional: Negative. HENT: Negative. Eyes: Negative. Respiratory: Negative. Cardiovascular: Negative. Gastrointestinal: Negative. Genitourinary: Negative. Musculoskeletal: Negative. Skin: Negative. Neurological: Negative. Endo/Heme/Allergies: Negative. Psychiatric/Behavioral: Negative. Physical Exam  Vitals and nursing note reviewed. HENT:      Head: Normocephalic and atraumatic. Right Ear: External ear normal.      Left Ear: External ear normal.      Nose: Nose normal.   Eyes:      Conjunctiva/sclera: Conjunctivae normal.   Cardiovascular:      Rate and Rhythm: Normal rate and regular rhythm.    Pulmonary:      Effort: Pulmonary effort is normal. Breath sounds: Normal breath sounds. Abdominal:      General: Bowel sounds are normal. There is no distension. Palpations: Abdomen is soft. Tenderness: There is no abdominal tenderness. Musculoskeletal:         General: Normal range of motion. Cervical back: Normal range of motion and neck supple. Right lower leg: Edema present. Left lower leg: Edema present. Skin:     General: Skin is warm and dry. Neurological:      General: No focal deficit present. Mental Status: She is alert. /87 (BP 1 Location: Right upper arm, BP Patient Position: Sitting, BP Cuff Size: Adult)   Pulse 84   Temp 98 °F (36.7 °C) (Temporal)   Resp 16   Ht 5' 7\" (1.702 m)   Wt 239 lb 12.8 oz (108.8 kg)   LMP 03/15/2023   SpO2 100%   BMI 37.56 kg/m²     No Known Allergies    Current Outpatient Medications   Medication Sig    ferrous fumarate 324 mg (106 mg iron) tab Take 324 mg by mouth daily. glucose blood VI test strips (blood glucose test) strip Check blood glucose fasting daily. TRUE METRIX TEST STRIPS    lisinopriL (PRINIVIL, ZESTRIL) 40 mg tablet Take 0.5 Tablets by mouth daily. amLODIPine (NORVASC) 10 mg tablet TAKE 1 TABLET BY MOUTH DAILY    atorvastatin (LIPITOR) 20 mg tablet Take 1 Tablet by mouth daily. spironolactone (ALDACTONE) 25 mg tablet Take 1 Tablet by mouth daily. hydroCHLOROthiazide (HYDRODIURIL) 12.5 mg tablet Take 1 Tablet by mouth daily as needed (SWELLING). glucose blood VI test strips (blood glucose test) strip Check blood glucose fasting daily    Farxiga 10 mg tab tablet Take 10 mg by mouth daily. ergocalciferol (ERGOCALCIFEROL) 1,250 mcg (50,000 unit) capsule Take 1 Cap by mouth every seven (7) days. aspirin 81 mg chewable tablet Take 81 mg by mouth daily. prenatal multivit-ca-min-fe-fa tab Take  by mouth. (Patient not taking: No sig reported)     No current facility-administered medications for this visit.        Past Medical History: Diagnosis Date    Gestational diabetes     Hypertension        Past Surgical History:   Procedure Laterality Date    HX  SECTION         Social History:  reports that she has never smoked. She has never used smokeless tobacco. She reports that she does not drink alcohol and does not use drugs. Patient Care Team:  Monse Erwin MD as PCP - General (Family Medicine)  Monse Erwin MD as PCP - [facility]  Joyce Maddox MD (Cardiovascular Disease Physician)    Problem List  Date Reviewed: 3/15/2023            Codes Class Noted    Diabetes mellitus type II, controlled ([facility] ICD-10-CM: E11.9  ICD-9-CM: 250.00  12/15/2022        Menorrhagia with regular cycle ICD-10-CM: N92.0  ICD-9-CM: 626.2  12/15/2022        Essential hypertension ICD-10-CM: I10  ICD-9-CM: 401.9  2020        Mixed hyperlipidemia ICD-10-CM: E78.2  ICD-9-CM: 272.2  2020        Vitamin D deficiency ICD-10-CM: E55.9  ICD-9-CM: 268.9  2020        Iron deficiency anemia ICD-10-CM: D50.9  ICD-9-CM: 280.9  2020        Abnormal fasting glucose ICD-10-CM: R73.01  ICD-9-CM: 790.29  2020        Severe obesity ([facility] ICD-10-CM: E66.01  ICD-9-CM: 278.01  2019                I ADVISED PATIENT TO GO TO ER IF SYMPTOMS WORSEN , CHANGE OR FAILS TO IMPROVE. I have discussed the diagnosis with the patient and the intended plan as seen in the above orders. The patient has received an after-visit summary and questions were answered concerning future plans. I have discussed medication side effects and warnings with the patient as well. The patient agrees and understands above plan. An electronic signature was used to authenticate this note.   -- Jessica Quintero MD

## 2023-03-20 DIAGNOSIS — N39.0 URINARY TRACT INFECTION WITHOUT HEMATURIA, SITE UNSPECIFIED: ICD-10-CM

## 2023-03-20 DIAGNOSIS — D50.9 IRON DEFICIENCY ANEMIA, UNSPECIFIED IRON DEFICIENCY ANEMIA TYPE: ICD-10-CM

## 2023-03-20 DIAGNOSIS — E55.9 VITAMIN D DEFICIENCY: Primary | ICD-10-CM

## 2023-03-20 RX ORDER — ERGOCALCIFEROL 1.25 MG/1
50000 CAPSULE ORAL
Qty: 12 CAPSULE | Refills: 5 | Status: SHIPPED | OUTPATIENT
Start: 2023-03-20

## 2023-03-20 RX ORDER — FERROUS FUMARATE 324(106)MG
324 TABLET ORAL DAILY
Qty: 90 TABLET | Refills: 3 | Status: SHIPPED | OUTPATIENT
Start: 2023-03-20

## 2023-03-20 RX ORDER — AMOXICILLIN 500 MG/1
500 CAPSULE ORAL 3 TIMES DAILY
Qty: 30 CAPSULE | Refills: 0 | Status: SHIPPED | OUTPATIENT
Start: 2023-03-20 | End: 2023-03-30

## 2023-03-22 ENCOUNTER — TRANSCRIBE ORDER (OUTPATIENT)
Dept: SCHEDULING | Age: 43
End: 2023-03-22

## 2023-03-22 DIAGNOSIS — N92.1 METRORRHAGIA: Primary | ICD-10-CM

## 2023-04-24 ENCOUNTER — HOSPITAL ENCOUNTER (OUTPATIENT)
Dept: ULTRASOUND IMAGING | Age: 43
Discharge: HOME | End: 2023-04-24
Attending: FAMILY MEDICINE
Payer: COMMERCIAL

## 2023-04-24 ENCOUNTER — HOSPITAL ENCOUNTER (OUTPATIENT)
Dept: MAMMOGRAPHY | Age: 43
Discharge: HOME | End: 2023-04-24
Attending: FAMILY MEDICINE
Payer: COMMERCIAL

## 2023-04-24 DIAGNOSIS — N92.1 MENOMETRORRHAGIA: ICD-10-CM

## 2023-04-24 DIAGNOSIS — Z12.31 SCREENING MAMMOGRAM, ENCOUNTER FOR: ICD-10-CM

## 2023-04-24 DIAGNOSIS — N92.1 METRORRHAGIA: Primary | ICD-10-CM

## 2023-04-24 DIAGNOSIS — N92.1 METRORRHAGIA: ICD-10-CM

## 2023-04-24 PROCEDURE — 76856 US EXAM PELVIC COMPLETE: CPT

## 2023-04-24 PROCEDURE — 76830 TRANSVAGINAL US NON-OB: CPT

## 2023-04-24 PROCEDURE — 77067 SCR MAMMO BI INCL CAD: CPT

## 2023-04-26 ENCOUNTER — OFFICE VISIT (OUTPATIENT)
Dept: FAMILY MEDICINE CLINIC | Age: 43
End: 2023-04-26
Payer: COMMERCIAL

## 2023-04-26 VITALS
HEART RATE: 76 BPM | SYSTOLIC BLOOD PRESSURE: 143 MMHG | DIASTOLIC BLOOD PRESSURE: 90 MMHG | HEIGHT: 67 IN | BODY MASS INDEX: 37.51 KG/M2 | TEMPERATURE: 98.2 F | OXYGEN SATURATION: 100 % | WEIGHT: 239 LBS

## 2023-04-26 DIAGNOSIS — G89.29 CHRONIC RLQ PAIN: ICD-10-CM

## 2023-04-26 DIAGNOSIS — E66.01 CLASS 2 SEVERE OBESITY WITH SERIOUS COMORBIDITY AND BODY MASS INDEX (BMI) OF 37.0 TO 37.9 IN ADULT, UNSPECIFIED OBESITY TYPE (HCC): ICD-10-CM

## 2023-04-26 DIAGNOSIS — Z12.11 SCREENING FOR MALIGNANT NEOPLASM OF COLON: Primary | ICD-10-CM

## 2023-04-26 DIAGNOSIS — I10 ESSENTIAL HYPERTENSION: ICD-10-CM

## 2023-04-26 DIAGNOSIS — N30.90 CYSTITIS: ICD-10-CM

## 2023-04-26 DIAGNOSIS — E11.59 CONTROLLED TYPE 2 DIABETES MELLITUS WITH OTHER CIRCULATORY COMPLICATION, WITHOUT LONG-TERM CURRENT USE OF INSULIN (HCC): ICD-10-CM

## 2023-04-26 DIAGNOSIS — R10.31 CHRONIC RLQ PAIN: ICD-10-CM

## 2023-04-26 PROCEDURE — 99214 OFFICE O/P EST MOD 30 MIN: CPT | Performed by: FAMILY MEDICINE

## 2023-04-26 PROCEDURE — 3044F HG A1C LEVEL LT 7.0%: CPT | Performed by: FAMILY MEDICINE

## 2023-04-26 PROCEDURE — 3080F DIAST BP >= 90 MM HG: CPT | Performed by: FAMILY MEDICINE

## 2023-04-26 PROCEDURE — 3077F SYST BP >= 140 MM HG: CPT | Performed by: FAMILY MEDICINE

## 2023-04-26 RX ORDER — INSULIN PUMP SYRINGE, 3 ML
EACH MISCELLANEOUS
Qty: 1 KIT | Refills: 0 | Status: SHIPPED | OUTPATIENT
Start: 2023-04-26

## 2023-04-26 RX ORDER — IBUPROFEN 200 MG
CAPSULE ORAL
Qty: 100 STRIP | Refills: 11 | Status: SHIPPED | OUTPATIENT
Start: 2023-04-26

## 2023-04-26 RX ORDER — SEMAGLUTIDE 1.34 MG/ML
0.25 INJECTION, SOLUTION SUBCUTANEOUS
Qty: 1 BOX | Refills: 2 | Status: SHIPPED | OUTPATIENT
Start: 2023-04-26

## 2023-04-26 NOTE — PROGRESS NOTES
2023   Sharif Porras (: 1980) is a 43 y.o. female, established patient, here for evaluation of the following chief complaint(s):  ED Follow-up (Seen at [facility] on 23  for Sharp right side pain. Pt states she still just have a little pain.) and Hypertension     ASSESSMENT/PLAN:  Below is the assessment and plan developed based on review of pertinent history, physical exam, labs, studies, and medications. 1. Screening for malignant neoplasm of colon  -     OCCULT BLOOD IMMUNOASSAY,DIAGNOSTIC; Future  2. Controlled type 2 diabetes mellitus with other circulatory complication, without long-term current use of insulin (HCC)  -     semaglutide (Ozempic) 0.25 mg or 0.5 mg/dose (2 mg/1.5 ml) subq pen; 0.25 mg by SubCUTAneous route every seven (7) days. , Normal, Disp-1 Box, R-2  -     glucose blood VI test strips (blood glucose test) strip; Check blood glucose fasting daily, one touch ultra, Print, Disp-100 Strip, R-11  -     Blood-Glucose Meter monitoring kit; Check blood glucose daily, one touch ultra, Print, Disp-1 Kit, R-0  3. BMI 37.0-37.9, adult  -     semaglutide (Ozempic) 0.25 mg or 0.5 mg/dose (2 mg/1.5 ml) subq pen; 0.25 mg by SubCUTAneous route every seven (7) days. , Normal, Disp-1 Box, R-2  4. Class 2 severe obesity with serious comorbidity and body mass index (BMI) of 37.0 to 37.9 in adult, unspecified obesity type (HCC)  -     semaglutide (Ozempic) 0.25 mg or 0.5 mg/dose (2 mg/1.5 ml) subq pen; 0.25 mg by SubCUTAneous route every seven (7) days. , Normal, Disp-1 Box, R-2  5. Cystitis  -     CULTURE, URINE; Future  6. Essential hypertension  7. Chronic RLQ pain    Ms. Erica Haskins has been given the following recommendations today due to her elevated BP reading: rescreen BP within a minimum of 2 weeks and lifestyle modifications to include: dietary sodium restriction. Return in about 1 month (around 2023) for follow up. SUBJECTIVE/OBJECTIVE:  HPI     1.  Screening for malignant neoplasm of colon  Iron level is low. I will check stool fit test.    2. Controlled type 2 diabetes mellitus with other circulatory complication, without long-term current use of insulin ([address]  Currently taking [place]. Patient would like to switch from [place] to [address] [facility]. I will start Ozempic at 0.25 mg today. Patient will continue Ozempic for 2 weeks. If she tolerates it she will correct Farxiga in half. Patient to continue monitor blood sugars at home and follow-up with her log. I will increase dose of Ozempic after 4 weeks. 3. BMI 37.0-37.9, adult  I have reviewed/discussed the above normal BMI with the patient. I have recommended the following interventions: dietary management education, guidance, and counseling, encourage exercise, and monitor weight . .       4. Class 2 severe obesity with serious comorbidity and body mass index (BMI) of 37.0 to 37.9 in adult, unspecified obesity type ([address]  Start Ozempic    5. Cystitis  CT scan done in the ER shows inflammation in the urinary bladder most likely due to cystitis. Patient was recently treated with amoxicillin for UTI. I will recheck culture to make sure this is resolved. In regards to her right lower quadrant pain, patient went to ER on 4/16/2023. Got labs drawn and CT scan done. CT scan was negative for any acute process. I have also reviewed recent labs and ultrasound results with the patient and her  was present in the room today. I have advised her to make appoint with OB/GYN for Pap smear and pelvic exam.  Patient to continue iron supplement and vitamin D supplement. No results found for any visits on 04/26/23. Review of Systems   Constitutional: Negative. HENT: Negative. Eyes: Negative. Respiratory: Negative. Cardiovascular: Negative. Gastrointestinal: Negative. Genitourinary: Negative. Musculoskeletal: Negative. Skin: Negative. Neurological: Negative. Endo/Heme/Allergies: Negative. Psychiatric/Behavioral: Negative. Physical Exam  Vitals and nursing note reviewed. HENT:      Head: Normocephalic and atraumatic. Right Ear: External ear normal.      Left Ear: External ear normal.      Nose: Nose normal.   Eyes:      Conjunctiva/sclera: Conjunctivae normal.   Cardiovascular:      Rate and Rhythm: Normal rate and regular rhythm. Pulmonary:      Effort: Pulmonary effort is normal.      Breath sounds: Normal breath sounds. Abdominal:      General: Bowel sounds are normal. There is no distension. Palpations: Abdomen is soft. Tenderness: There is abdominal tenderness in the right lower quadrant and suprapubic area. Musculoskeletal:         General: Normal range of motion. Cervical back: Normal range of motion and neck supple. Skin:     General: Skin is warm and dry. Neurological:      General: No focal deficit present. Mental Status: She is alert. BP (!) 143/90 (BP 1 Location: Left upper arm, BP Patient Position: Sitting, BP Cuff Size: Large adult)   Pulse 76   Temp 98.2 °F (36.8 °C) (Temporal)   Ht 5' 7\" (1.702 m)   Wt 239 lb (108.4 kg)   LMP 04/10/2023 (Approximate)   SpO2 100%   BMI 37.43 kg/m²     No Known Allergies    Current Outpatient Medications   Medication Sig    semaglutide (Ozempic) 0.25 mg or 0.5 mg/dose (2 mg/1.5 ml) subq pen 0.25 mg by SubCUTAneous route every seven (7) days. glucose blood VI test strips (blood glucose test) strip Check blood glucose fasting daily, one touch ultra    Blood-Glucose Meter monitoring kit Check blood glucose daily, one touch ultra    ergocalciferol (ERGOCALCIFEROL) 1,250 mcg (50,000 unit) capsule Take 1 Capsule by mouth every seven (7) days. Indications: vitamin D deficiency (high dose therapy)    ferrous fumarate 324 mg (106 mg iron) tab Take 324 mg by mouth daily. lisinopriL (PRINIVIL, ZESTRIL) 40 mg tablet Take 0.5 Tablets by mouth daily.     amLODIPine (NORVASC) 10 mg tablet TAKE 1 TABLET BY MOUTH DAILY    atorvastatin (LIPITOR) 20 mg tablet Take 1 Tablet by mouth daily. spironolactone (ALDACTONE) 25 mg tablet Take 1 Tablet by mouth daily. hydroCHLOROthiazide (HYDRODIURIL) 12.5 mg tablet Take 1 Tablet by mouth daily as needed (SWELLING). Farxiga 10 mg tab tablet Take 1 Tablet by mouth daily. ergocalciferol (ERGOCALCIFEROL) 1,250 mcg (50,000 unit) capsule Take 1 Cap by mouth every seven (7) days. aspirin 81 mg chewable tablet Take 1 Tablet by mouth daily. prenatal multivit-ca-min-fe-fa tab Take  by mouth. (Patient not taking: Reported on 2022)     No current facility-administered medications for this visit. Past Medical History:   Diagnosis Date    Gestational diabetes     Hypertension        Past Surgical History:   Procedure Laterality Date    HX  SECTION         Social History:  reports that she has never smoked. She has never used smokeless tobacco. She reports that she does not drink alcohol and does not use drugs.     Patient Care Team:  Parish Quiroz MD as PCP - General (Family Medicine)  Parish Quiroz [facility]CP - [facility]  Lori Osorio MD (Cardiovascular Disease Physician)    Problem List  Date Reviewed: 2023            Codes Class Noted    Diabetes mellitus type II, controlled ([facility] ICD-10-CM: E11.9  ICD-9-CM: 250.00  12/15/2022        Menorrhagia with regular cycle ICD-10-CM: N92.0  ICD-9-CM: 626.2  12/15/2022        Essential hypertension ICD-10-CM: I10  ICD-9-CM: 401.9  2020        Mixed hyperlipidemia ICD-10-CM: E78.2  ICD-9-CM: 272.2  2020        Vitamin D deficiency ICD-10-CM: E55.9  ICD-9-CM: 268.9  2020        Iron deficiency anemia ICD-10-CM: D50.9  ICD-9-CM: 280.9  2020        Abnormal fasting glucose ICD-10-CM: R73.01  ICD-9-CM: 790.29  2020        Severe obesity ([facility] ICD-10-CM: E66.01  ICD-9-CM: 278.01  2019                I ADVISED PATIENT TO GO TO ER IF SYMPTOMS WORSEN , CHANGE OR FAILS TO IMPROVE. I have discussed the diagnosis with the patient and the intended plan as seen in the above orders. The patient has received an after-visit summary and questions were answered concerning future plans. I have discussed medication side effects and warnings with the patient as well. The patient agrees and understands above plan. An electronic signature was used to authenticate this note.   -- Tristen Arrieta MD

## 2023-04-26 NOTE — PROGRESS NOTES
Identified pt with two pt identifiers. Reviewed record in preparation for visit and have obtained necessary documentation. All patient medications has been reviewed. Chief Complaint   Patient presents with    ED Follow-up     Seen at [facility] on 4/16/23  for Sharp right side pain. Pt states she still just have a little pain. Hypertension     Additional information about chief complaint:    Visit Vitals  BP (!) 143/90 (BP 1 Location: Left upper arm, BP Patient Position: Sitting, BP Cuff Size: Large adult)   Pulse 76   Temp 98.2 °F (36.8 °C) (Temporal)   Ht 5' 7\" (1.702 m)   Wt 239 lb (108.4 kg)   SpO2 100%   BMI 37.43 kg/m²       Health Maintenance Due   Topic    COVID-19 Vaccine (1)    Varicella Vaccine (1 of 2 - 2-dose childhood series)    Pneumococcal 0-64 years (1 - PCV)    Foot Exam Q1     Eye Exam Retinal or Dilated     Hepatitis B Vaccine (1 of 3 - Risk 3-dose series)    DTaP/Tdap/Td series (1 - Tdap)    Cervical cancer screen        1. Have you been to the ER, urgent care clinic since your last visit? Hospitalized since your last visit? yes    2. Have you seen or consulted any other health care providers outside of the [address] [place] [address] since your last visit? Include any pap smears or colon screening.  no

## 2023-04-26 NOTE — PATIENT INSTRUCTIONS
Low Sodium Diet (2,000 Milligram): Care Instructions  Overview     Limiting sodium can be an important part of managing some health problems. The most common source of sodium is salt. People get most of the salt in their diet from canned, prepared, and packaged foods. Fast food and restaurant meals also are very high in sodium. Your doctor will probably limit your sodium to less than 2,000 milligrams (mg) a day. This limit counts all the sodium in prepared and packaged foods and any salt you add to your food. Follow-up care is a key part of your treatment and safety. Be sure to make and go to all appointments, and call your doctor if you are having problems. It's also a good idea to know your test results and keep a list of the medicines you take. How can you care for yourself at home? Read food labels  Read labels on cans and food packages. The labels tell you how much sodium is in each serving. Make sure that you look at the serving size. If you eat more than the serving size, you have eaten more sodium. Food labels also tell you the Percent Daily Value for sodium. Choose products with low Percent Daily Values for sodium. Be aware that sodium can come in forms other than salt, including monosodium glutamate (MSG), sodium citrate, and sodium bicarbonate (baking soda). MSG is often added to Asian food. When you eat out, you can sometimes ask for food without MSG or added salt. Buy low-sodium foods  Buy foods that are labeled \"unsalted\" (no salt added), \"sodium-free\" (less than 5 mg of sodium per serving), or \"low-sodium\" (140 mg or less of sodium per serving). Foods labeled \"reduced-sodium\" and \"light sodium\" may still have too much sodium. Be sure to read the label to see how much sodium you are getting. Buy fresh vegetables, or frozen vegetables without added sauces. Buy low-sodium versions of canned vegetables, soups, and other canned goods.   Prepare low-sodium meals  Cut back on the amount of salt you use in cooking. This will help you adjust to the taste. Do not add salt after cooking. One teaspoon of salt has about 2,300 mg of sodium. Take the salt shaker off the table. Flavor your food with garlic, lemon juice, onion, vinegar, herbs, and spices. Do not use soy sauce, lite soy sauce, steak sauce, onion salt, garlic salt, celery salt, or ketchup on your food. Use low-sodium salad dressings, sauces, and ketchup. Or make your own salad dressings and sauces without adding salt. Use less salt (or none) when recipes call for it. You can often use half the salt a recipe calls for without losing flavor. Other foods such as rice, pasta, and grains do not need added salt. Rinse canned vegetables, and cook them in fresh water. This removes some--but not all--of the salt. Avoid water that is naturally high in sodium or that has been treated with water softeners, which add sodium. If you buy bottled water, read the label and choose a sodium-free brand. Avoid high-sodium foods  Avoid eating:  Smoked, cured, salted, and canned meat, fish, and poultry. Ham, neal, hot dogs, and luncheon meats. Regular, hard, and processed cheese and regular peanut butter. Crackers with salted tops, and other salted snack foods such as pretzels, chips, and salted popcorn. Frozen prepared meals, unless labeled low-sodium. Canned and dried soups, broths, and bouillon, unless labeled sodium-free or low-sodium. Canned vegetables, unless labeled sodium-free or low-sodium. [place] fries, pizza, tacos, and other fast foods. Pickles, olives, ketchup, and other condiments, especially soy sauce, unless labeled sodium-free or low-sodium. Where can you learn more? Go to http://jessica-nell.info/  Enter V843 in the search box to learn more about \"Low Sodium Diet (2,000 Milligram): Care Instructions. \"  Current as of: May 9, 2022               Content Version: 13.6  © 0862-2669 [employer].    Care instructions adapted under license by [employer] (which disclaims liability or warranty for this information). If you have questions about a medical condition or this instruction, always ask your healthcare professional. [address] any warranty or liability for your use of this information.

## 2023-04-29 LAB
BACTERIA SPEC CULT: ABNORMAL
BACTERIA SPEC CULT: ABNORMAL
CC UR VC: ABNORMAL
SERVICE CMNT-IMP: ABNORMAL

## 2023-05-11 ENCOUNTER — TELEPHONE (OUTPATIENT)
Facility: CLINIC | Age: 43
End: 2023-05-11

## 2023-05-11 DIAGNOSIS — E11.59 TYPE 2 DIABETES MELLITUS WITH OTHER CIRCULATORY COMPLICATIONS (HCC): Primary | ICD-10-CM

## 2023-05-11 RX ORDER — SEMAGLUTIDE 0.68 MG/ML
0.25 INJECTION, SOLUTION SUBCUTANEOUS
Qty: 3 ML | Refills: 3 | Status: SHIPPED | OUTPATIENT
Start: 2023-05-11

## 2023-05-16 RX ORDER — HYDROCHLOROTHIAZIDE 12.5 MG/1
12.5 TABLET ORAL DAILY PRN
COMMUNITY
Start: 2023-03-15

## 2023-05-16 RX ORDER — ATORVASTATIN CALCIUM 20 MG/1
20 TABLET, FILM COATED ORAL DAILY
COMMUNITY
Start: 2023-03-15

## 2023-05-16 RX ORDER — ERGOCALCIFEROL 1.25 MG/1
50000 CAPSULE ORAL
COMMUNITY
Start: 2020-04-29

## 2023-05-16 RX ORDER — AMLODIPINE BESYLATE 10 MG/1
1 TABLET ORAL DAILY
COMMUNITY
Start: 2023-03-15

## 2023-05-16 RX ORDER — FERROUS FUMARATE 324(106)MG
324 TABLET ORAL DAILY
COMMUNITY
Start: 2023-03-20

## 2023-05-16 RX ORDER — LISINOPRIL 40 MG/1
20 TABLET ORAL DAILY
COMMUNITY
Start: 2023-03-15

## 2023-05-16 RX ORDER — SPIRONOLACTONE 25 MG/1
25 TABLET ORAL DAILY
COMMUNITY
Start: 2023-03-15

## 2023-05-16 RX ORDER — ASPIRIN 81 MG/1
81 TABLET, CHEWABLE ORAL DAILY
COMMUNITY

## 2023-05-25 ENCOUNTER — OFFICE VISIT (OUTPATIENT)
Age: 43
End: 2023-05-25
Payer: MEDICAID

## 2023-05-25 VITALS — DIASTOLIC BLOOD PRESSURE: 89 MMHG | SYSTOLIC BLOOD PRESSURE: 152 MMHG | BODY MASS INDEX: 37.46 KG/M2 | WEIGHT: 239.2 LBS

## 2023-05-25 DIAGNOSIS — Z11.51 SCREENING FOR HPV (HUMAN PAPILLOMAVIRUS): ICD-10-CM

## 2023-05-25 DIAGNOSIS — Z01.419 ENCNTR FOR GYN EXAM (GENERAL) (ROUTINE) W/O ABN FINDINGS: Primary | ICD-10-CM

## 2023-05-25 DIAGNOSIS — N92.0 MENORRHAGIA WITH REGULAR CYCLE: ICD-10-CM

## 2023-05-25 DIAGNOSIS — D50.0 IRON DEFICIENCY ANEMIA DUE TO CHRONIC BLOOD LOSS: ICD-10-CM

## 2023-05-25 PROCEDURE — 3079F DIAST BP 80-89 MM HG: CPT | Performed by: OBSTETRICS & GYNECOLOGY

## 2023-05-25 PROCEDURE — 3077F SYST BP >= 140 MM HG: CPT | Performed by: OBSTETRICS & GYNECOLOGY

## 2023-05-25 PROCEDURE — 99386 PREV VISIT NEW AGE 40-64: CPT | Performed by: OBSTETRICS & GYNECOLOGY

## 2023-05-25 NOTE — PROGRESS NOTES
Suhail Cruz is a 43 y.o. female returns for an annual exam     Chief Complaint   Patient presents with    Annual Exam       Patient's last menstrual period was 05/04/2023. Her periods are moderate, heavy in flow and usually regular with a 26-32 day interval with 3-7 day duration. She does not have dysmenorrhea. Problems: right pelvic pain, had US before but nothing was found  Birth Control: none. Last Pap: a few years ago per patient  She does not have a history of HIPOLITO 2, 3 or cervical cancer.    Last Mammogram: 4/28/2023 negative      Examination chaperoned by Sandy Gonzalez MA.
normal  Cervix: normal   Uterus: normal size, shape and consistency  Adnexa: no adnexal tenderness present, no adnexal masses present  Perineum: perineum within normal limits, no evidence of trauma, no rashes or skin lesions present  Anus: anus within normal limits, no hemorrhoids present  Inguinal Lymph Nodes: no lymphadenopathy present    Assessment:  Routine gynecologic examination  Her current medical status is satisfactory with no evidence of significant gynecologic issues.   Menorrhagia to anemia  Hx of CS x 5  Plan:  Counseled re: diet, exercise, healthy lifestyle  Return for yearly wellness visits  Rec annual mammogram  Pap/HPV  Call with menses - will try Mirena again

## 2023-06-01 LAB
CYTOLOGIST CVX/VAG CYTO: NORMAL
CYTOLOGY CVX/VAG DOC CYTO: NORMAL
CYTOLOGY CVX/VAG DOC THIN PREP: NORMAL
DX ICD CODE: NORMAL
HPV GENOTYPE REFLEX: NORMAL
HPV I/H RISK 4 DNA CVX QL PROBE+SIG AMP: NEGATIVE
Lab: NORMAL
OTHER STN SPEC: NORMAL
STAT OF ADQ CVX/VAG CYTO-IMP: NORMAL

## 2023-06-09 ENCOUNTER — TELEMEDICINE (OUTPATIENT)
Facility: CLINIC | Age: 43
End: 2023-06-09
Payer: MEDICAID

## 2023-06-09 DIAGNOSIS — E11.59 TYPE 2 DIABETES MELLITUS WITH OTHER CIRCULATORY COMPLICATIONS (HCC): ICD-10-CM

## 2023-06-09 PROCEDURE — 3044F HG A1C LEVEL LT 7.0%: CPT | Performed by: FAMILY MEDICINE

## 2023-06-09 PROCEDURE — 99213 OFFICE O/P EST LOW 20 MIN: CPT | Performed by: FAMILY MEDICINE

## 2023-06-09 RX ORDER — SEMAGLUTIDE 0.68 MG/ML
0.25 INJECTION, SOLUTION SUBCUTANEOUS
Qty: 3 ML | Refills: 3 | Status: SHIPPED | OUTPATIENT
Start: 2023-06-09

## 2023-06-09 SDOH — ECONOMIC STABILITY: HOUSING INSECURITY
IN THE LAST 12 MONTHS, WAS THERE A TIME WHEN YOU DID NOT HAVE A STEADY PLACE TO SLEEP OR SLEPT IN A SHELTER (INCLUDING NOW)?: NO

## 2023-06-09 SDOH — ECONOMIC STABILITY: INCOME INSECURITY: HOW HARD IS IT FOR YOU TO PAY FOR THE VERY BASICS LIKE FOOD, HOUSING, MEDICAL CARE, AND HEATING?: NOT HARD AT ALL

## 2023-06-09 SDOH — ECONOMIC STABILITY: FOOD INSECURITY: WITHIN THE PAST 12 MONTHS, YOU WORRIED THAT YOUR FOOD WOULD RUN OUT BEFORE YOU GOT MONEY TO BUY MORE.: NEVER TRUE

## 2023-06-09 SDOH — ECONOMIC STABILITY: FOOD INSECURITY: WITHIN THE PAST 12 MONTHS, THE FOOD YOU BOUGHT JUST DIDN'T LAST AND YOU DIDN'T HAVE MONEY TO GET MORE.: NEVER TRUE

## 2023-06-09 ASSESSMENT — PATIENT HEALTH QUESTIONNAIRE - PHQ9
2. FEELING DOWN, DEPRESSED OR HOPELESS: 0
SUM OF ALL RESPONSES TO PHQ QUESTIONS 1-9: 0
SUM OF ALL RESPONSES TO PHQ9 QUESTIONS 1 & 2: 0
SUM OF ALL RESPONSES TO PHQ QUESTIONS 1-9: 0
1. LITTLE INTEREST OR PLEASURE IN DOING THINGS: 0
SUM OF ALL RESPONSES TO PHQ QUESTIONS 1-9: 0
SUM OF ALL RESPONSES TO PHQ QUESTIONS 1-9: 0

## 2023-06-09 ASSESSMENT — ENCOUNTER SYMPTOMS
RESPIRATORY NEGATIVE: 1
EYES NEGATIVE: 1
GASTROINTESTINAL NEGATIVE: 1
ALLERGIC/IMMUNOLOGIC NEGATIVE: 1

## 2023-06-09 NOTE — PROGRESS NOTES
Consent: Ignacio Mora, who was seen by synchronous (real-time) audio-video technology, and/or her healthcare decision maker, is aware that this patient-initiated, Telehealth encounter on 6/9/2023 is a billable service, with coverage as determined by her insurance carrier. She is aware that she may receive a bill and has provided verbal consent to proceed: Yes. Assessment & Plan:   Veronica Knutson was seen today for follow-up. Diagnoses and all orders for this visit:    Type 2 diabetes mellitus with other circulatory complications ([facility] [address]  -     Semaglutide,0.25 or 0.5MG/DOS, (OZEMPIC, 0.25 OR 0.5 MG/DOSE,) 2 MG/3ML SOPN; Inject 0.25 mg into the skin every 7 days          Follow-up and Dispositions    Return in about 1 month (around 7/9/2023) for weight check. I ADVISED PATIENT TO GO TO ER IF SYMPTOMS WORSEN , CHANGE OR FAILS TO IMPROVE. I spent at least 15 minutes with this established patient, and >50% of the time was spent counseling and/or coordinating care regarding SEE BELOW  712  Subjective:   Ignacio Mora is a 43 y.o. 1980 female  established patient, who was seen for Follow-up        Diagnoses and all orders for this visit:  Type 2 diabetes mellitus with other circulatory complications ([facility] [address]  Patient was previously prescribed Ozempic. Patient reports she did not get it filled at the pharmacy as it was not covered by her insurance. Prior Auth was done by our office. She is requesting Aldona Keep to be discontinued from her medication list as pharmacist has told her she is not able to start Ozempic until she is taking Aldona Keep. I have discontinued Farxiga from her medication list.  I have again sent prescription for Ozempic. I have advised patient to start Ozempic, keep blood sugar log and follow-up in 1 month to adjust dose. I have also discussed with her if Ozempic is not covered by her insurance we can switch to Trulicity or Victoza. Patient presents today for diabetes follow up.  Pt. current

## 2023-06-09 NOTE — PROGRESS NOTES
1. Have you been to the ER, urgent care clinic since your last visit? Hospitalized since your last visit? No    2. Have you seen or consulted any other health care providers outside of the [address] [place] [address] since your last visit? Include any pap smears or colon screening. No    Chief Complaint   Patient presents with    Follow-up     Health Maintenance Due   Topic Date Due    COVID-19 Vaccine (1) Never done    Varicella vaccine (1 of 2 - 2-dose childhood series) Never done    Pneumococcal 0-64 years Vaccine (1 - PCV) Never done    Diabetic foot exam  Never done    HIV screen  Never done    Diabetic retinal exam  Never done    Hepatitis B vaccine (1 of 3 - Risk 3-dose series) Never done    DTaP/Tdap/Td vaccine (1 - Tdap) Never done     PHQ-9 Total Score: 0 (6/9/2023 10:38 AM)    AMB Abuse Screening 6/9/2023   Do you ever feel afraid of your partner? N   Are you in a relationship with someone who physically or mentally threatens you? N   Is it safe for you to go home?  Y     Patient-Reported Vitals 6/9/2023   Patient-Reported Weight 239lb   Patient-Reported Height 5'7

## 2023-06-12 ENCOUNTER — TELEPHONE (OUTPATIENT)
Facility: CLINIC | Age: 43
End: 2023-06-12

## 2023-07-24 DIAGNOSIS — I10 ESSENTIAL (PRIMARY) HYPERTENSION: ICD-10-CM

## 2023-07-24 RX ORDER — SPIRONOLACTONE 25 MG/1
TABLET ORAL
Qty: 90 TABLET | Refills: 1 | Status: SHIPPED | OUTPATIENT
Start: 2023-07-24

## 2023-07-24 NOTE — TELEPHONE ENCOUNTER
Dennison Favre, MD  Last seen on 6/9/2023 Virtual  Medication filled on 3/15/2023 by Jeff Sierra MD  Office Visit on 05/25/2023   Component Date Value Ref Range Status    Diagnosis: 05/25/2023 Comment   Final    NEGATIVE FOR INTRAEPITHELIAL LESION OR MALIGNANCY. Specimen adequacy: 05/25/2023 Comment   Final    Comment: Satisfactory for evaluation. Endocervical and/or squamous metaplastic  cells (endocervical component) are present. Clinician Provided ICD 05/25/2023 Comment   Final    Comment: N49.303  Z11.51      Performed by: 05/25/2023 Comment   Final    Sarita Tejada, Cytotechnologist (ASCP)    . 05/25/2023 . Final    Note: 05/25/2023 Comment   Final    Comment: The Pap smear is a screening test designed to aid in the detection of  premalignant and malignant conditions of the uterine cervix. It is not a  diagnostic procedure and should not be used as the sole means of detecting  cervical cancer. Both false-positive and false-negative reports do occur. Methodology: 05/25/2023 Comment   Final    Comment: This liquid based ThinPrep(R) pap test was screened with the  use of an image guided system. HPV Aptima 05/25/2023 Negative  Negative Final    Comment: This nucleic acid amplification test detects fourteen high-risk  HPV types (16,18,31,33,35,39,45,51,52,56,58,59,66,68) without  differentiation. HPV Genotype Reflex 05/25/2023 Comment   Final    Criteria not met, HPV Genotype not performed.      Health Maintenance Due   Topic Date Due    COVID-19 Vaccine (1) Never done    Varicella vaccine (1 of 2 - 2-dose childhood series) Never done    Pneumococcal 0-64 years Vaccine (1 - PCV) Never done    Diabetic foot exam  Never done    HIV screen  Never done    Diabetic retinal exam  Never done    Hepatitis B vaccine (1 of 3 - Risk 3-dose series) Never done    DTaP/Tdap/Td vaccine (1 - Tdap) Never done

## 2023-08-26 DIAGNOSIS — M25.473 EFFUSION, UNSPECIFIED ANKLE: ICD-10-CM

## 2023-08-30 RX ORDER — HYDROCHLOROTHIAZIDE 12.5 MG/1
TABLET ORAL
Qty: 30 TABLET | Refills: 1 | Status: SHIPPED | OUTPATIENT
Start: 2023-08-30

## 2023-08-30 NOTE — TELEPHONE ENCOUNTER
PCP: Sarah Shafer MD    Last appt: [unfilled]  Last appt was on 06/2023 virtual visit and labs were from 03/2023. She does not have any follow up appts scheduled  No future appointments. Requested Prescriptions     Pending Prescriptions Disp Refills    hydroCHLOROthiazide (HYDRODIURIL) 12.5 MG tablet [Pharmacy Med Name: HYDROCHLOROTHIAZIDE 12.5 MG TB] 30 tablet 1     Sig: TAKE 1 TABLET BY MOUTH DAILY AS NEEDED (SWELLING).        Prior labs and Blood pressures:  BP Readings from Last 3 Encounters:   05/25/23 (!) 152/89   04/26/23 (!) 143/90   03/15/23 135/87     Lab Results   Component Value Date/Time     03/17/2023 10:17 AM    K 4.5 03/17/2023 10:17 AM     03/17/2023 10:17 AM    CO2 27 03/17/2023 10:17 AM    BUN 16 03/17/2023 10:17 AM    GFRAA >60 11/19/2019 11:27 AM     No results found for: HBA1C, GLR0EDYC  Lab Results   Component Value Date/Time    CHOL 149 03/17/2023 10:17 AM    HDL 44 03/17/2023 10:17 AM     No results found for: VITD3, VD3RIA    Lab Results   Component Value Date/Time    TSH 0.520 03/17/2023 10:17 AM

## 2023-12-12 DIAGNOSIS — M25.473 EFFUSION, UNSPECIFIED ANKLE: ICD-10-CM

## 2023-12-13 RX ORDER — HYDROCHLOROTHIAZIDE 12.5 MG/1
TABLET ORAL
Qty: 30 TABLET | Refills: 0 | Status: SHIPPED | OUTPATIENT
Start: 2023-12-13

## 2023-12-13 NOTE — TELEPHONE ENCOUNTER
Chief Complaint   Patient presents with    Medication Refill       Requested Prescriptions     Pending Prescriptions Disp Refills    hydroCHLOROthiazide (HYDRODIURIL) 12.5 MG tablet [Pharmacy Med Name: HYDROCHLOROTHIAZIDE 12.5 MG TB] 30 tablet 1     Sig: TAKE 1 TABLET BY MOUTH DAILY AS NEEDED (SWELLING).        Allergies:  No Known Allergies    Last visit with clinic:  6/9/2023   Next visit with clinic: Visit date not found     Last visit with this provider: 6/9/2023   Next Visit with this provider: Visit date not found    Signed by Dalila Chavez MA CMA  12/13/23  8:29 AM

## 2024-01-15 ENCOUNTER — OFFICE VISIT (OUTPATIENT)
Facility: CLINIC | Age: 44
End: 2024-01-15
Payer: COMMERCIAL

## 2024-01-15 VITALS
RESPIRATION RATE: 16 BRPM | DIASTOLIC BLOOD PRESSURE: 94 MMHG | BODY MASS INDEX: 38.61 KG/M2 | HEIGHT: 67 IN | SYSTOLIC BLOOD PRESSURE: 164 MMHG | OXYGEN SATURATION: 100 % | HEART RATE: 97 BPM | TEMPERATURE: 98.1 F | WEIGHT: 246 LBS

## 2024-01-15 DIAGNOSIS — E55.9 VITAMIN D DEFICIENCY: ICD-10-CM

## 2024-01-15 DIAGNOSIS — D50.9 IRON DEFICIENCY ANEMIA, UNSPECIFIED IRON DEFICIENCY ANEMIA TYPE: ICD-10-CM

## 2024-01-15 DIAGNOSIS — E78.2 MIXED HYPERLIPIDEMIA: ICD-10-CM

## 2024-01-15 DIAGNOSIS — E11.9 CONTROLLED TYPE 2 DIABETES MELLITUS WITHOUT COMPLICATION, WITHOUT LONG-TERM CURRENT USE OF INSULIN (HCC): ICD-10-CM

## 2024-01-15 DIAGNOSIS — I10 ESSENTIAL (PRIMARY) HYPERTENSION: Primary | ICD-10-CM

## 2024-01-15 PROBLEM — R73.01 ABNORMAL FASTING GLUCOSE: Status: RESOLVED | Noted: 2020-11-27 | Resolved: 2024-01-15

## 2024-01-15 LAB
ANION GAP SERPL CALC-SCNC: 6 MMOL/L (ref 5–15)
APPEARANCE UR: CLEAR
BACTERIA URNS QL MICRO: NEGATIVE /HPF
BILIRUB UR QL: NEGATIVE
BUN SERPL-MCNC: 13 MG/DL (ref 6–20)
BUN/CREAT SERPL: 19 (ref 12–20)
CALCIUM SERPL-MCNC: 9.3 MG/DL (ref 8.5–10.1)
CHLORIDE SERPL-SCNC: 104 MMOL/L (ref 97–108)
CO2 SERPL-SCNC: 24 MMOL/L (ref 21–32)
COLOR UR: NORMAL
CREAT SERPL-MCNC: 0.67 MG/DL (ref 0.55–1.02)
CREAT UR-MCNC: <13 MG/DL
EPITH CASTS URNS QL MICRO: NORMAL /LPF
ERYTHROCYTE [DISTWIDTH] IN BLOOD BY AUTOMATED COUNT: 14.8 % (ref 11.5–14.5)
GLUCOSE SERPL-MCNC: 144 MG/DL (ref 65–100)
GLUCOSE UR STRIP.AUTO-MCNC: NEGATIVE MG/DL
HCT VFR BLD AUTO: 34.4 % (ref 35–47)
HGB BLD-MCNC: 11.3 G/DL (ref 11.5–16)
HGB UR QL STRIP: NEGATIVE
HYALINE CASTS URNS QL MICRO: NORMAL /LPF (ref 0–5)
KETONES UR QL STRIP.AUTO: NEGATIVE MG/DL
LEUKOCYTE ESTERASE UR QL STRIP.AUTO: NEGATIVE
MCH RBC QN AUTO: 26.5 PG (ref 26–34)
MCHC RBC AUTO-ENTMCNC: 32.8 G/DL (ref 30–36.5)
MCV RBC AUTO: 80.8 FL (ref 80–99)
MICROALBUMIN UR-MCNC: <0.5 MG/DL
MICROALBUMIN/CREAT UR-RTO: <38 MG/G (ref 0–30)
NITRITE UR QL STRIP.AUTO: NEGATIVE
NRBC # BLD: 0 K/UL (ref 0–0.01)
NRBC BLD-RTO: 0 PER 100 WBC
PH UR STRIP: 7.5 (ref 5–8)
PLATELET # BLD AUTO: 498 K/UL (ref 150–400)
PMV BLD AUTO: 9.6 FL (ref 8.9–12.9)
POTASSIUM SERPL-SCNC: 3.9 MMOL/L (ref 3.5–5.1)
PROT UR STRIP-MCNC: NEGATIVE MG/DL
RBC # BLD AUTO: 4.26 M/UL (ref 3.8–5.2)
RBC #/AREA URNS HPF: NORMAL /HPF (ref 0–5)
SODIUM SERPL-SCNC: 134 MMOL/L (ref 136–145)
SP GR UR REFRACTOMETRY: 1.01 (ref 1–1.03)
URINE CULTURE IF INDICATED: NORMAL
UROBILINOGEN UR QL STRIP.AUTO: 0.2 EU/DL (ref 0.2–1)
WBC # BLD AUTO: 8.7 K/UL (ref 3.6–11)
WBC URNS QL MICRO: NORMAL /HPF (ref 0–4)

## 2024-01-15 PROCEDURE — 3080F DIAST BP >= 90 MM HG: CPT | Performed by: STUDENT IN AN ORGANIZED HEALTH CARE EDUCATION/TRAINING PROGRAM

## 2024-01-15 PROCEDURE — 99214 OFFICE O/P EST MOD 30 MIN: CPT | Performed by: STUDENT IN AN ORGANIZED HEALTH CARE EDUCATION/TRAINING PROGRAM

## 2024-01-15 PROCEDURE — 3077F SYST BP >= 140 MM HG: CPT | Performed by: STUDENT IN AN ORGANIZED HEALTH CARE EDUCATION/TRAINING PROGRAM

## 2024-01-15 RX ORDER — ATORVASTATIN CALCIUM 20 MG/1
20 TABLET, FILM COATED ORAL DAILY
Qty: 90 TABLET | Refills: 1 | Status: SHIPPED | OUTPATIENT
Start: 2024-01-15

## 2024-01-15 RX ORDER — HYDROCHLOROTHIAZIDE 12.5 MG/1
12.5 TABLET ORAL DAILY
Qty: 90 TABLET | Refills: 1 | Status: SHIPPED | OUTPATIENT
Start: 2024-01-15 | End: 2024-01-15 | Stop reason: DRUGHIGH

## 2024-01-15 RX ORDER — GLUCOSAMINE HCL/CHONDROITIN SU 500-400 MG
CAPSULE ORAL
Qty: 100 STRIP | Refills: 3 | Status: SHIPPED | OUTPATIENT
Start: 2024-01-15

## 2024-01-15 RX ORDER — HYDROCHLOROTHIAZIDE 25 MG/1
25 TABLET ORAL EVERY MORNING
Qty: 30 TABLET | Refills: 1 | Status: SHIPPED | OUTPATIENT
Start: 2024-01-15

## 2024-01-15 RX ORDER — FERROUS FUMARATE 324(106)MG
324 TABLET ORAL DAILY
Qty: 90 TABLET | Refills: 1 | Status: SHIPPED | OUTPATIENT
Start: 2024-01-15

## 2024-01-15 ASSESSMENT — ENCOUNTER SYMPTOMS
VOMITING: 0
SHORTNESS OF BREATH: 0
COUGH: 0
NAUSEA: 0
ABDOMINAL PAIN: 0
RHINORRHEA: 0

## 2024-01-15 NOTE — PROGRESS NOTES
Assessment/Plan:     Diagnoses and all orders for this visit:    Essential (primary) hypertension  -     CBC; Future  -     Basic Metabolic Panel; Future  -     Microalbumin / Creatinine Urine Ratio; Future  -     Urinalysis with Reflex to Culture; Future  -     hydroCHLOROthiazide (HYDRODIURIL) 25 MG tablet; Take 1 tablet by mouth every morning  -Chronic.  Uncontrolled.  -Increase hydrochlorothiazide from 12.5 mg to now 25 mg daily  -Continue current dosage of lisinopril, amlodipine and spironolactone  -Check routine lab work  -Follow-up in 4 weeks for reevaluation or sooner if needed    Controlled type 2 diabetes mellitus without complication, without long-term current use of insulin (HCC)  -     metFORMIN (GLUCOPHAGE) 500 MG tablet; Take 1 tablet by mouth 2 times daily (with meals)  -     CBC; Future  -     Basic Metabolic Panel; Future  -     Hemoglobin A1C; Future  -     Microalbumin / Creatinine Urine Ratio; Future  -     Urinalysis with Reflex to Culture; Future  -      DIABETES FOOT EXAM  -     blood glucose monitor strips; Use to check fasting glucose daily and as needed; One Touch (provide brand to match meter)  -Chronic.  Presumed stable.  -Check routine lab work including A1c.  -Patient was unable to afford Ozempic therefore was started on metformin twice daily  -Continue current dosage of metformin  -Advised to schedule routine diabetic eye exam    Vitamin D deficiency  -     Vitamin D 25 Hydroxy; Future  -Chronic.  Presumed stable.  -Has not been taking any oral vitamin D for the past 2 months  -Check vitamin D level today    Iron deficiency anemia, unspecified iron deficiency anemia type  -     Ferrous Fumarate 324 (106 Fe) MG TABS; Take 1 tablet by mouth daily  -     CBC; Future  -Chronic.  Presumed stable.  -History of heavy menstrual cycles that are stable/regular per patient   -Currently on oral iron  -Repeat CBC today    Mixed hyperlipidemia  -     atorvastatin (LIPITOR) 20 MG tablet; Take 1

## 2024-01-15 NOTE — PROGRESS NOTES
Identified pt with two pt identifiers(name and ). Reviewed record in preparation for visit and have obtained necessary documentation.  Chief Complaint   Patient presents with    Medication Refill    Blood Work        Health Maintenance Due   Topic    Hepatitis B vaccine (1 of 3 - 3-dose series)    COVID-19 Vaccine (1)    Varicella vaccine (1 of 2 - 2-dose childhood series)    Pneumococcal 0-64 years Vaccine (1 - PCV)    Diabetic foot exam     HIV screen     Diabetic retinal exam     DTaP/Tdap/Td vaccine (1 - Tdap)    Flu vaccine (1)       Coordination of Care Questionnaire:  :   1) Have you been to an emergency room, urgent care, or hospitalized since your last visit?  If yes, where when, and reason for visit? no      2. Have seen or consulted any other health care provider since your last visit?   If yes, where when, and reason for visit?  no        Patient is accompanied by self I have received verbal consent from Sarah Wyman to discuss any/all medical information while they are present in the room.

## 2024-01-16 LAB
25(OH)D3 SERPL-MCNC: 10.1 NG/ML (ref 30–100)
EST. AVERAGE GLUCOSE BLD GHB EST-MCNC: 154 MG/DL
HBA1C MFR BLD: 7 % (ref 4–5.6)

## 2024-01-19 ENCOUNTER — TELEPHONE (OUTPATIENT)
Facility: CLINIC | Age: 44
End: 2024-01-19

## 2024-01-21 DIAGNOSIS — R79.89 ELEVATED PLATELET COUNT: Primary | ICD-10-CM

## 2024-01-21 DIAGNOSIS — E55.9 VITAMIN D DEFICIENCY: ICD-10-CM

## 2024-01-21 RX ORDER — CHOLECALCIFEROL (VITAMIN D3) 1250 MCG
1 CAPSULE ORAL WEEKLY
Qty: 12 CAPSULE | Refills: 0 | Status: SHIPPED | OUTPATIENT
Start: 2024-01-21

## 2024-02-13 ENCOUNTER — OFFICE VISIT (OUTPATIENT)
Facility: CLINIC | Age: 44
End: 2024-02-13
Payer: COMMERCIAL

## 2024-02-13 VITALS
TEMPERATURE: 97.8 F | WEIGHT: 245.4 LBS | OXYGEN SATURATION: 100 % | SYSTOLIC BLOOD PRESSURE: 171 MMHG | DIASTOLIC BLOOD PRESSURE: 111 MMHG | BODY MASS INDEX: 38.52 KG/M2 | RESPIRATION RATE: 20 BRPM | HEART RATE: 81 BPM | HEIGHT: 67 IN

## 2024-02-13 DIAGNOSIS — I10 ESSENTIAL (PRIMARY) HYPERTENSION: Primary | ICD-10-CM

## 2024-02-13 PROCEDURE — 99214 OFFICE O/P EST MOD 30 MIN: CPT | Performed by: STUDENT IN AN ORGANIZED HEALTH CARE EDUCATION/TRAINING PROGRAM

## 2024-02-13 PROCEDURE — 3080F DIAST BP >= 90 MM HG: CPT | Performed by: STUDENT IN AN ORGANIZED HEALTH CARE EDUCATION/TRAINING PROGRAM

## 2024-02-13 PROCEDURE — 3077F SYST BP >= 140 MM HG: CPT | Performed by: STUDENT IN AN ORGANIZED HEALTH CARE EDUCATION/TRAINING PROGRAM

## 2024-02-13 RX ORDER — LISINOPRIL 40 MG/1
40 TABLET ORAL DAILY
Qty: 30 TABLET | Refills: 1 | Status: SHIPPED | OUTPATIENT
Start: 2024-02-13

## 2024-02-13 NOTE — PROGRESS NOTES
dentified pt with two pt identifiers(name and ).    Chief Complaint   Patient presents with    1 Month Follow-Up     Patient here for a 4 weeks follow up.        Health Maintenance Due   Topic    Hepatitis B vaccine (1 of 3 - 3-dose series)    COVID-19 Vaccine (1)    Varicella vaccine (1 of 2 - 2-dose childhood series)    Pneumococcal 0-64 years Vaccine (1 - PCV)    HIV screen     Diabetic retinal exam     DTaP/Tdap/Td vaccine (1 - Tdap)    Flu vaccine (1)       Wt Readings from Last 3 Encounters:   24 111.3 kg (245 lb 6.4 oz)   01/15/24 111.6 kg (246 lb)   23 108.5 kg (239 lb 3.2 oz)     Temp Readings from Last 3 Encounters:   24 97.8 °F (36.6 °C) (Temporal)   01/15/24 98.1 °F (36.7 °C) (Temporal)     BP Readings from Last 3 Encounters:   24 (!) 177/128   01/15/24 (!) 164/94   23 (!) 152/89     Pulse Readings from Last 3 Encounters:   24 81   01/15/24 97   23 76           Coordination of Care Questionnaire:  :   1. \"Have you been to the ER, urgent care clinic since your last visit?  Hospitalized since your last visit?\" no    2. \"Have you seen or consulted any other health care providers outside of the [facility] since your last visit?\" no     3. For patients aged 45-75: Has the patient had a colonoscopy / FIT/ Cologuard? no      If the patient is female:    4. For patients aged 40-74: Has the patient had a mammogram within the past 2 years? no      5. For patients aged 21-65: Has the patient had a pap smear? no     3) Do you have an Advance Directive on file? no  Are you interested in receiving information about Advance Directives? no    Patient is accompanied by selt I have received verbal consent from Sarah Wyman to discuss any/all medical information while they are present in the room.

## 2024-02-13 NOTE — PROGRESS NOTES
Assessment/Plan:     Diagnoses and all orders for this visit:    Essential (primary) hypertension  -     lisinopril (PRINIVIL;ZESTRIL) 40 MG tablet; Take 1 tablet by mouth daily  -     Basic Metabolic Panel; Future  -Chronic.  BP in office today significantly elevated.  Patient is asymptomatic.  -Patient states she has been evaluated by cardiology in the past due to hypertension.  Requesting records for review  -Continue on hydrochlorothiazide 25 mg daily, amlodipine 10 mg daily and spironolactone 25 mg daily  -Increase lisinopril from 20 to 40 mg daily  -Check BMP today  -Will have close follow-up with patient in 1 week for reevaluation  -Monitor electrolytes closely  -ED precautions discussed      Return in about 1 week (around 2/20/2024), or if symptoms worsen or fail to improve.     Discussed expected course/resolution/complications of diagnosis in detail with patient.    Medication risks/benefits/costs/interactions/alternatives discussed with patient.    Pt expressed understanding with the diagnosis and plan      Subjective:      Sarah Wyman is a 43 y.o. female who presents for had concerns including 1 Month Follow-Up (Patient here for a 4 weeks follow up.).     Current Outpatient Medications   Medication Sig Dispense Refill    lisinopril (PRINIVIL;ZESTRIL) 40 MG tablet Take 1 tablet by mouth daily 30 tablet 1    Cholecalciferol (VITAMIN D3) 1.25 MG (12995 UT) CAPS Take 1 capsule by mouth once a week 12 capsule 0    atorvastatin (LIPITOR) 20 MG tablet Take 1 tablet by mouth daily 90 tablet 1    Ferrous Fumarate 324 (106 Fe) MG TABS Take 1 tablet by mouth daily 90 tablet 1    metFORMIN (GLUCOPHAGE) 500 MG tablet Take 1 tablet by mouth 2 times daily (with meals) 180 tablet 1    hydroCHLOROthiazide (HYDRODIURIL) 25 MG tablet Take 1 tablet by mouth every morning 30 tablet 1    spironolactone (ALDACTONE) 25 MG tablet TAKE 1 TABLET BY MOUTH EVERY DAY 90 tablet 1    amLODIPine (NORVASC) 10 MG tablet Take 1 tablet by

## 2024-02-14 LAB
ANION GAP SERPL CALC-SCNC: 4 MMOL/L (ref 5–15)
BUN SERPL-MCNC: 9 MG/DL (ref 6–20)
BUN/CREAT SERPL: 14 (ref 12–20)
CALCIUM SERPL-MCNC: 9.7 MG/DL (ref 8.5–10.1)
CHLORIDE SERPL-SCNC: 105 MMOL/L (ref 97–108)
CO2 SERPL-SCNC: 27 MMOL/L (ref 21–32)
CREAT SERPL-MCNC: 0.64 MG/DL (ref 0.55–1.02)
GLUCOSE SERPL-MCNC: 126 MG/DL (ref 65–100)
POTASSIUM SERPL-SCNC: 4.2 MMOL/L (ref 3.5–5.1)
SODIUM SERPL-SCNC: 136 MMOL/L (ref 136–145)

## 2024-02-20 ENCOUNTER — OFFICE VISIT (OUTPATIENT)
Facility: CLINIC | Age: 44
End: 2024-02-20

## 2024-02-20 VITALS
DIASTOLIC BLOOD PRESSURE: 86 MMHG | RESPIRATION RATE: 20 BRPM | HEART RATE: 98 BPM | TEMPERATURE: 97.2 F | WEIGHT: 240.8 LBS | OXYGEN SATURATION: 100 % | BODY MASS INDEX: 37.79 KG/M2 | SYSTOLIC BLOOD PRESSURE: 137 MMHG | HEIGHT: 67 IN

## 2024-02-20 DIAGNOSIS — I10 ESSENTIAL (PRIMARY) HYPERTENSION: Primary | ICD-10-CM

## 2024-02-20 DIAGNOSIS — E11.9 CONTROLLED TYPE 2 DIABETES MELLITUS WITHOUT COMPLICATION, WITHOUT LONG-TERM CURRENT USE OF INSULIN (HCC): ICD-10-CM

## 2024-02-20 RX ORDER — HYDRALAZINE HYDROCHLORIDE 50 MG/1
50 TABLET, FILM COATED ORAL 2 TIMES DAILY
COMMUNITY
End: 2024-02-20

## 2024-02-20 RX ORDER — BLOOD-GLUCOSE METER
1 KIT MISCELLANEOUS DAILY
Qty: 1 KIT | Refills: 0 | Status: SHIPPED | OUTPATIENT
Start: 2024-02-20

## 2024-02-20 ASSESSMENT — ENCOUNTER SYMPTOMS
RHINORRHEA: 0
SHORTNESS OF BREATH: 0
ABDOMINAL PAIN: 0
VOMITING: 0
COUGH: 0
NAUSEA: 0

## 2024-02-20 NOTE — PROGRESS NOTES
dentified pt with two pt identifiers(name and ).    Chief Complaint   Patient presents with    Follow-up     Patient here for a 1 week follow up for hypertension.        Health Maintenance Due   Topic    Hepatitis B vaccine (1 of 3 - 3-dose series)    COVID-19 Vaccine (1)    Varicella vaccine (1 of 2 - 2-dose childhood series)    Pneumococcal 0-64 years Vaccine (1 - PCV)    HIV screen     Diabetic retinal exam     DTaP/Tdap/Td vaccine (1 - Tdap)    Flu vaccine (1)    Lipids        Wt Readings from Last 3 Encounters:   24 109.2 kg (240 lb 12.8 oz)   24 111.3 kg (245 lb 6.4 oz)   01/15/24 111.6 kg (246 lb)     Temp Readings from Last 3 Encounters:   24 97.2 °F (36.2 °C) (Temporal)   24 97.8 °F (36.6 °C) (Temporal)   01/15/24 98.1 °F (36.7 °C) (Temporal)     BP Readings from Last 3 Encounters:   24 (!) 141/94   24 (!) 171/111   01/15/24 (!) 164/94     Pulse Readings from Last 3 Encounters:   24 98   24 81   01/15/24 97           Coordination of Care Questionnaire:  :   1. \"Have you been to the ER, urgent care clinic since your last visit?  Hospitalized since your last visit?\" no    2. \"Have you seen or consulted any other health care providers outside of the [facility] since your last visit?\" no     3. For patients aged 45-75: Has the patient had a colonoscopy / FIT/ Cologuard? no      If the patient is female:    4. For patients aged 40-74: Has the patient had a mammogram within the past 2 years? no      5. For patients aged 21-65: Has the patient had a pap smear? no     3) Do you have an Advance Directive on file? no  Are you interested in receiving information about Advance Directives? no    Patient is accompanied by  I have received verbal consent from Sarah Wyman to discuss any/all medical information while they are present in the room.   
   Ferrous Fumarate 324 (106 Fe) MG TABS Take 1 tablet by mouth daily 90 tablet 1    metFORMIN (GLUCOPHAGE) 500 MG tablet Take 1 tablet by mouth 2 times daily (with meals) 180 tablet 1    hydroCHLOROthiazide (HYDRODIURIL) 25 MG tablet Take 1 tablet by mouth every morning 30 tablet 1    spironolactone (ALDACTONE) 25 MG tablet TAKE 1 TABLET BY MOUTH EVERY DAY 90 tablet 1    amLODIPine (NORVASC) 10 MG tablet Take 1 tablet by mouth daily      aspirin 81 MG chewable tablet Take 1 tablet by mouth daily      blood glucose monitor strips Use to check fasting glucose daily and as needed; One Touch (provide brand to match meter) (Patient not taking: Reported on 2024) 100 strip 3     No current facility-administered medications for this visit.       No Known Allergies  Past Medical History:   Diagnosis Date    Gestational diabetes     Hypertension       Past Surgical History:   Procedure Laterality Date     SECTION        Family History   Problem Relation Age of Onset    Hypertension Mother       Social History     Socioeconomic History    Marital status:      Spouse name: Not on file    Number of children: Not on file    Years of education: Not on file    Highest education level: Not on file   Occupational History    Not on file   Tobacco Use    Smoking status: Never    Smokeless tobacco: Never   Vaping Use    Vaping Use: Never used   Substance and Sexual Activity    Alcohol use: No    Drug use: No    Sexual activity: Yes     Partners: Male   Other Topics Concern    Not on file   Social History Narrative    Not on file     Social Determinants of Health     Financial Resource Strain: Low Risk  (2023)    Overall Financial Resource Strain (CARDIA)     Difficulty of Paying Living Expenses: Not hard at all   Food Insecurity: Not on file (2023)   Transportation Needs: Unknown (2023)    PRAPARE - Transportation     Lack of Transportation (Medical): Not on file     Lack of Transportation (Non-Medical):

## 2024-03-07 DIAGNOSIS — I10 ESSENTIAL (PRIMARY) HYPERTENSION: ICD-10-CM

## 2024-03-07 RX ORDER — LISINOPRIL 40 MG/1
40 TABLET ORAL DAILY
Qty: 30 TABLET | Refills: 0 | Status: SHIPPED | OUTPATIENT
Start: 2024-03-07

## 2024-03-07 NOTE — TELEPHONE ENCOUNTER
PCP: Porsha Griffin MD    Last appt: [unfilled]  Future Appointments   Date Time Provider Department Center   4/22/2024 11:00 AM Porsha Griffin MD [facility] AMB   4/23/2024 11:00 AM Clara Scott MD [facility] AMB       Requested Prescriptions     Pending Prescriptions Disp Refills    lisinopril (PRINIVIL;ZESTRIL) 40 MG tablet 90 tablet 0     Sig: Take 1 tablet by mouth daily       Prior labs and Blood pressures:  BP Readings from Last 3 Encounters:   02/20/24 137/86   02/13/24 (!) 171/111   01/15/24 (!) 164/94     Lab Results   Component Value Date/Time     02/13/2024 11:45 AM    K 4.2 02/13/2024 11:45 AM     02/13/2024 11:45 AM    CO2 27 02/13/2024 11:45 AM    BUN 9 02/13/2024 11:45 AM    GFRAA >60 11/19/2019 11:27 AM     No results found for: \"HBA1C\", \"ZHL1GMEI\"  Lab Results   Component Value Date/Time    CHOL 149 03/17/2023 10:17 AM    HDL 44 03/17/2023 10:17 AM     No results found for: \"VITD3\", \"VD3RIA\"    Lab Results   Component Value Date/Time    TSH 0.520 03/17/2023 10:17 AM

## 2024-04-19 ENCOUNTER — HOSPITAL ENCOUNTER (EMERGENCY)
Facility: HOSPITAL | Age: 44
Discharge: HOME | End: 2024-04-19
Attending: EMERGENCY MEDICINE
Payer: COMMERCIAL

## 2024-04-19 VITALS
TEMPERATURE: 98.7 F | WEIGHT: 240.3 LBS | HEIGHT: 63 IN | DIASTOLIC BLOOD PRESSURE: 93 MMHG | RESPIRATION RATE: 16 BRPM | HEART RATE: 75 BPM | SYSTOLIC BLOOD PRESSURE: 153 MMHG | OXYGEN SATURATION: 100 % | BODY MASS INDEX: 42.58 KG/M2

## 2024-04-19 DIAGNOSIS — N61.1 BREAST ABSCESS: Primary | ICD-10-CM

## 2024-04-19 PROCEDURE — 2500000003 HC RX 250 WO HCPCS: Performed by: PHYSICIAN ASSISTANT

## 2024-04-19 PROCEDURE — 10061 I&D ABSCESS COMP/MULTIPLE: CPT

## 2024-04-19 PROCEDURE — 99283 EMERGENCY DEPT VISIT LOW MDM: CPT

## 2024-04-19 RX ORDER — DOXYCYCLINE HYCLATE 100 MG/1
100 CAPSULE ORAL 2 TIMES DAILY
Qty: 14 CAPSULE | Refills: 0 | Status: SHIPPED | OUTPATIENT
Start: 2024-04-19 | End: 2024-04-26

## 2024-04-19 RX ORDER — LIDOCAINE HYDROCHLORIDE 10 MG/ML
5 INJECTION, SOLUTION EPIDURAL; INFILTRATION; INTRACAUDAL; PERINEURAL ONCE
Status: COMPLETED | OUTPATIENT
Start: 2024-04-19 | End: 2024-04-19

## 2024-04-19 RX ADMIN — LIDOCAINE HYDROCHLORIDE 5 ML: 10 INJECTION, SOLUTION EPIDURAL; INFILTRATION; INTRACAUDAL; PERINEURAL at 18:12

## 2024-04-19 ASSESSMENT — PAIN DESCRIPTION - LOCATION: LOCATION: BREAST

## 2024-04-19 ASSESSMENT — PAIN - FUNCTIONAL ASSESSMENT
PAIN_FUNCTIONAL_ASSESSMENT: 0-10
PAIN_FUNCTIONAL_ASSESSMENT: ACTIVITIES ARE NOT PREVENTED

## 2024-04-19 ASSESSMENT — PAIN DESCRIPTION - PAIN TYPE: TYPE: ACUTE PAIN

## 2024-04-19 ASSESSMENT — ENCOUNTER SYMPTOMS
NAUSEA: 0
VOMITING: 0

## 2024-04-19 ASSESSMENT — PAIN SCALES - GENERAL: PAINLEVEL_OUTOF10: 8

## 2024-04-19 ASSESSMENT — PAIN DESCRIPTION - ORIENTATION: ORIENTATION: RIGHT

## 2024-04-19 ASSESSMENT — PAIN DESCRIPTION - DESCRIPTORS: DESCRIPTORS: SHARP;THROBBING

## 2024-04-19 NOTE — DISCHARGE INSTRUCTIONS
Please follow-up with your primary care as scheduled in 3 days for wound check.  If you notice any increased pain, swelling, redness, fevers please return to the emergency room.  Please start taking your antibiotics as prescribed.  Continue to apply warm compress to the area thank you for allowing us to provide you with medical care today.  We realize that you have many choices for your emergency care needs.  We thank you for choosing [facility].  Please choose us in the future for any continued health care needs.     The exam and treatment you received in the Emergency Department were for an emergent problem and are not intended as complete care. It is important that you follow up with a doctor, nurse practitioner, or physician's assistant for ongoing care. If your symptoms worsen or you do not improve as expected and you are unable to reach your usual health care provider, you should return to the Emergency Department. We are available 24 hours a day.     Please make an appointment with your health care provider(s) for follow up of your Emergency Department visit.  Take this sheet with you when you go to your follow-up visit.

## 2024-04-19 NOTE — ED PROVIDER NOTES
[facility] EMERGENCY DEPT  EMERGENCY DEPARTMENT ENCOUNTER      Pt Name: Sarah Wyman  MRN: [id number]  Birthdate 1980  Date of evaluation: 4/19/2024  Provider: Julita Nunez PA-C    CHIEF COMPLAINT       Chief Complaint   Patient presents with    Breast Pain    Abscess         HISTORY OF PRESENT ILLNESS   (Location/Symptom, Timing/Onset, Context/Setting, Quality, Duration, Modifying Factors, Severity)  Note limiting factors.   43-year-old female with history of diabetes who presents with abscess to the right breast.  Patient reports history of similar symptoms.  Her previous one drained on its own.  She reports symptoms have been there for the past 2 days.  Today she noticed that the area began draining on its own.  She has been taking ibuprofen as needed for pain. Denies fevers. Blood glucose most recently 130s.      Abscess  Abscess quality: draining, painful and redness    Abscess quality: no induration    Red streaking: no    Duration:  2 days  Progression:  Improving  Pain details:     Quality:  Dull    Severity:  Mild    Duration:  2 days    Timing:  Constant  Chronicity:  Recurrent  Context: diabetes    Context: not immunosuppression, not injected drug use, not insect bite/sting and not skin injury    Relieved by:  NSAIDs  Associated symptoms: no fever, no headaches, no nausea and no vomiting    Risk factors: prior abscess    Risk factors: no family hx of MRSA and no hx of MRSA          Review of External Medical Records:     Nursing Notes were reviewed.    REVIEW OF SYSTEMS    (2-9 systems for level 4, 10 or more for level 5)     Review of Systems   Constitutional:  Negative for fever.   Gastrointestinal:  Negative for nausea and vomiting.   Neurological:  Negative for headaches.       Except as noted above the remainder of the review of systems was reviewed and negative.       PAST MEDICAL HISTORY     Past Medical History:   Diagnosis Date    Abscess     breasts and underarms    Gestational diabetes

## 2024-04-19 NOTE — ED TRIAGE NOTES
Pt ambulates to treatment area she states that for the past 2 days she has had an abscess under the right breast that opened last night.  There was a lot of yellow and green drainage.  She also had a fever last night and took Tylenol.  She continues to have pain to the breast.  She has had this happen in the past

## 2024-04-22 ENCOUNTER — OFFICE VISIT (OUTPATIENT)
Facility: CLINIC | Age: 44
End: 2024-04-22

## 2024-04-22 VITALS
TEMPERATURE: 97.5 F | OXYGEN SATURATION: 100 % | BODY MASS INDEX: 42.95 KG/M2 | DIASTOLIC BLOOD PRESSURE: 86 MMHG | HEIGHT: 63 IN | HEART RATE: 92 BPM | RESPIRATION RATE: 20 BRPM | SYSTOLIC BLOOD PRESSURE: 129 MMHG | WEIGHT: 242.4 LBS

## 2024-04-22 DIAGNOSIS — E78.2 MIXED HYPERLIPIDEMIA: ICD-10-CM

## 2024-04-22 DIAGNOSIS — N61.1 BREAST ABSCESS: ICD-10-CM

## 2024-04-22 DIAGNOSIS — I10 ESSENTIAL (PRIMARY) HYPERTENSION: ICD-10-CM

## 2024-04-22 DIAGNOSIS — E11.9 CONTROLLED TYPE 2 DIABETES MELLITUS WITHOUT COMPLICATION, WITHOUT LONG-TERM CURRENT USE OF INSULIN (HCC): Primary | ICD-10-CM

## 2024-04-22 DIAGNOSIS — E55.9 VITAMIN D DEFICIENCY: ICD-10-CM

## 2024-04-22 LAB
25(OH)D3 SERPL-MCNC: 22.1 NG/ML (ref 30–100)
ALBUMIN SERPL-MCNC: 3.2 G/DL (ref 3.5–5)
ALBUMIN/GLOB SERPL: 0.7 (ref 1.1–2.2)
ALP SERPL-CCNC: 108 U/L (ref 45–117)
ALT SERPL-CCNC: 16 U/L (ref 12–78)
ANION GAP SERPL CALC-SCNC: 9 MMOL/L (ref 5–15)
AST SERPL-CCNC: 13 U/L (ref 15–37)
BILIRUB SERPL-MCNC: 0.3 MG/DL (ref 0.2–1)
BUN SERPL-MCNC: 17 MG/DL (ref 6–20)
BUN/CREAT SERPL: 19 (ref 12–20)
CALCIUM SERPL-MCNC: 9.4 MG/DL (ref 8.5–10.1)
CHLORIDE SERPL-SCNC: 102 MMOL/L (ref 97–108)
CHOLEST SERPL-MCNC: 183 MG/DL
CO2 SERPL-SCNC: 21 MMOL/L (ref 21–32)
CREAT SERPL-MCNC: 0.89 MG/DL (ref 0.55–1.02)
EST. AVERAGE GLUCOSE BLD GHB EST-MCNC: 160 MG/DL
GLOBULIN SER CALC-MCNC: 4.4 G/DL (ref 2–4)
GLUCOSE SERPL-MCNC: 236 MG/DL (ref 65–100)
HBA1C MFR BLD: 7.2 % (ref 4–5.6)
HDLC SERPL-MCNC: 38 MG/DL
HDLC SERPL: 4.8 (ref 0–5)
LDLC SERPL CALC-MCNC: 121 MG/DL (ref 0–100)
POTASSIUM SERPL-SCNC: 4 MMOL/L (ref 3.5–5.1)
PROT SERPL-MCNC: 7.6 G/DL (ref 6.4–8.2)
SODIUM SERPL-SCNC: 132 MMOL/L (ref 136–145)
TRIGL SERPL-MCNC: 120 MG/DL
TSH SERPL DL<=0.05 MIU/L-ACNC: 0.68 UIU/ML (ref 0.36–3.74)
VLDLC SERPL CALC-MCNC: 24 MG/DL

## 2024-04-22 RX ORDER — SPIRONOLACTONE 25 MG/1
25 TABLET ORAL DAILY
Qty: 90 TABLET | Refills: 1 | Status: SHIPPED | OUTPATIENT
Start: 2024-04-22

## 2024-04-22 RX ORDER — HYDROCHLOROTHIAZIDE 25 MG/1
25 TABLET ORAL DAILY
Qty: 90 TABLET | Refills: 1 | Status: SHIPPED | OUTPATIENT
Start: 2024-04-22

## 2024-04-22 RX ORDER — ATORVASTATIN CALCIUM 20 MG/1
20 TABLET, FILM COATED ORAL DAILY
Qty: 90 TABLET | Refills: 1 | Status: SHIPPED | OUTPATIENT
Start: 2024-04-22

## 2024-04-22 RX ORDER — LISINOPRIL 40 MG/1
40 TABLET ORAL DAILY
Qty: 90 TABLET | Refills: 1 | Status: SHIPPED | OUTPATIENT
Start: 2024-04-22

## 2024-04-22 ASSESSMENT — ENCOUNTER SYMPTOMS
VOMITING: 0
SHORTNESS OF BREATH: 0
RHINORRHEA: 0
ABDOMINAL PAIN: 0
NAUSEA: 0
COUGH: 0

## 2024-04-22 NOTE — PROGRESS NOTES
[facility] [place]  Medical Oncology at [place]  [phone]    Hematology / Oncology Consult    Reason for Visit:   Sarah Wyman is a 43 y.o. female who is seen in consultation at the request of Dr. Porsha Grfifin for evaluation of thrombocytosis.     History of Present Illness:   Sarah Wyman is a 43 y.o. female with HTN, HLD is referred for thrombocytosis.    She started on oral iron supplements once daily on and off for past 2 years. She is having constipation, which she is managing with diet. Had heavy menstrual periods in the past, but now improved in past 6 months. No h/o gastric bypass surgery.     Pt has h/o abscess in breast and underarms in approx . Did recently have another boil which drained spontaneously under R breast last month.     Past Medical History:   Diagnosis Date    Abscess     breasts and underarms    Gestational diabetes     Hyperlipidemia     Hypertension       Past Surgical History:   Procedure Laterality Date     SECTION        Social History     Tobacco Use    Smoking status: Never    Smokeless tobacco: Never   Substance Use Topics    Alcohol use: No      Family History   Problem Relation Age of Onset    Hypertension Mother      Current Outpatient Medications   Medication Sig    atorvastatin (LIPITOR) 20 MG tablet Take 1 tablet by mouth daily    hydroCHLOROthiazide (HYDRODIURIL) 25 MG tablet Take 1 tablet by mouth daily    lisinopril (PRINIVIL;ZESTRIL) 40 MG tablet Take 1 tablet by mouth daily    metFORMIN (GLUCOPHAGE) 500 MG tablet Take 1 tablet by mouth 2 times daily (with meals)    spironolactone (ALDACTONE) 25 MG tablet Take 1 tablet by mouth daily    doxycycline hyclate (VIBRAMYCIN) 100 MG capsule Take 1 capsule by mouth 2 times daily for 7 days    glucose monitoring kit 1 kit by Does not apply route daily Use to check blood sugar once daily and as needed. Provide brand that matched glucose testing strips. (Patient not taking: Reported on 2024)

## 2024-04-23 ENCOUNTER — OFFICE VISIT (OUTPATIENT)
Age: 44
End: 2024-04-23
Payer: COMMERCIAL

## 2024-04-23 VITALS
OXYGEN SATURATION: 100 % | SYSTOLIC BLOOD PRESSURE: 121 MMHG | HEART RATE: 69 BPM | RESPIRATION RATE: 18 BRPM | DIASTOLIC BLOOD PRESSURE: 85 MMHG | TEMPERATURE: 97.9 F | WEIGHT: 242 LBS | HEIGHT: 62 IN | BODY MASS INDEX: 44.53 KG/M2

## 2024-04-23 DIAGNOSIS — D50.0 IRON DEFICIENCY ANEMIA DUE TO CHRONIC BLOOD LOSS: ICD-10-CM

## 2024-04-23 DIAGNOSIS — Z87.2 HISTORY OF BREAST ABSCESS: ICD-10-CM

## 2024-04-23 DIAGNOSIS — I10 ESSENTIAL (PRIMARY) HYPERTENSION: ICD-10-CM

## 2024-04-23 DIAGNOSIS — D75.839 THROMBOCYTOSIS: ICD-10-CM

## 2024-04-23 DIAGNOSIS — E11.9 CONTROLLED TYPE 2 DIABETES MELLITUS WITHOUT COMPLICATION, WITHOUT LONG-TERM CURRENT USE OF INSULIN (HCC): ICD-10-CM

## 2024-04-23 DIAGNOSIS — D75.839 THROMBOCYTOSIS: Primary | ICD-10-CM

## 2024-04-23 DIAGNOSIS — L02.92 BOILS OF MULTIPLE SITES: ICD-10-CM

## 2024-04-23 LAB
BASOPHILS # BLD: 0 K/UL (ref 0–0.1)
BASOPHILS NFR BLD: 0 % (ref 0–1)
DIFFERENTIAL METHOD BLD: ABNORMAL
EOSINOPHIL # BLD: 0.2 K/UL (ref 0–0.4)
EOSINOPHIL NFR BLD: 2 % (ref 0–7)
ERYTHROCYTE [DISTWIDTH] IN BLOOD BY AUTOMATED COUNT: 15.8 % (ref 11.5–14.5)
FERRITIN SERPL-MCNC: 9 NG/ML (ref 8–252)
HCT VFR BLD AUTO: 30.3 % (ref 35–47)
HGB BLD-MCNC: 9.5 G/DL (ref 11.5–16)
IMM GRANULOCYTES # BLD AUTO: 0.1 K/UL (ref 0–0.04)
IMM GRANULOCYTES NFR BLD AUTO: 1 % (ref 0–0.5)
IRON SATN MFR SERPL: 6 % (ref 20–50)
IRON SERPL-MCNC: 26 UG/DL (ref 35–150)
LYMPHOCYTES # BLD: 2.2 K/UL (ref 0.8–3.5)
LYMPHOCYTES NFR BLD: 24 % (ref 12–49)
MCH RBC QN AUTO: 26 PG (ref 26–34)
MCHC RBC AUTO-ENTMCNC: 31.4 G/DL (ref 30–36.5)
MCV RBC AUTO: 83 FL (ref 80–99)
MONOCYTES # BLD: 0.5 K/UL (ref 0–1)
MONOCYTES NFR BLD: 5 % (ref 5–13)
NEUTS SEG # BLD: 6.4 K/UL (ref 1.8–8)
NEUTS SEG NFR BLD: 68 % (ref 32–75)
NRBC # BLD: 0 K/UL (ref 0–0.01)
NRBC BLD-RTO: 0 PER 100 WBC
PLATELET # BLD AUTO: 475 K/UL (ref 150–400)
PMV BLD AUTO: 9.5 FL (ref 8.9–12.9)
RBC # BLD AUTO: 3.65 M/UL (ref 3.8–5.2)
TIBC SERPL-MCNC: 442 UG/DL (ref 250–450)
WBC # BLD AUTO: 9.4 K/UL (ref 3.6–11)

## 2024-04-23 PROCEDURE — 99204 OFFICE O/P NEW MOD 45 MIN: CPT | Performed by: INTERNAL MEDICINE

## 2024-04-23 PROCEDURE — 3051F HG A1C>EQUAL 7.0%<8.0%: CPT | Performed by: INTERNAL MEDICINE

## 2024-04-23 PROCEDURE — 3079F DIAST BP 80-89 MM HG: CPT | Performed by: INTERNAL MEDICINE

## 2024-04-23 PROCEDURE — 3074F SYST BP LT 130 MM HG: CPT | Performed by: INTERNAL MEDICINE

## 2024-04-23 RX ORDER — DOCUSATE SODIUM 100 MG/1
100 CAPSULE, LIQUID FILLED ORAL 2 TIMES DAILY
Qty: 60 CAPSULE | Refills: 3 | Status: SHIPPED | OUTPATIENT
Start: 2024-04-23

## 2024-04-23 RX ORDER — LANOLIN ALCOHOL/MO/W.PET/CERES
325 CREAM (GRAM) TOPICAL 2 TIMES DAILY WITH MEALS
Qty: 180 TABLET | Refills: 3 | Status: SHIPPED | OUTPATIENT
Start: 2024-04-23

## 2024-04-23 ASSESSMENT — PATIENT HEALTH QUESTIONNAIRE - PHQ9
2. FEELING DOWN, DEPRESSED OR HOPELESS: NOT AT ALL
SUM OF ALL RESPONSES TO PHQ QUESTIONS 1-9: 0
1. LITTLE INTEREST OR PLEASURE IN DOING THINGS: NOT AT ALL
SUM OF ALL RESPONSES TO PHQ9 QUESTIONS 1 & 2: 0
SUM OF ALL RESPONSES TO PHQ QUESTIONS 1-9: 0

## 2024-04-23 NOTE — PROGRESS NOTES
Chief Complaint   Patient presents with    New Patient           Vitals:    04/23/24 1102   BP: 121/85   Pulse: 69   Resp: 18   Temp: 97.9 °F (36.6 °C)   SpO2: 100%            1. Have you been to the ER, urgent care clinic since your last visit?  Hospitalized since your last visit?  New Patient  2. Have you seen or consulted any other health care providers outside of the [facility] since your last visit?  Include any pap smears or colon screening. New Patient

## 2024-04-24 ENCOUNTER — TELEPHONE (OUTPATIENT)
Age: 44
End: 2024-04-24

## 2024-04-24 NOTE — TELEPHONE ENCOUNTER
Called patient in regards to her referral to Dermatology. I informed her that [facility] is not in network with her insurance and that she will need to contact her insurance company to see which dermatologist is in network. Patient stated thank you.

## 2024-04-30 ENCOUNTER — HOSPITAL ENCOUNTER (OUTPATIENT)
Facility: HOSPITAL | Age: 44
Discharge: HOME | End: 2024-05-03
Payer: COMMERCIAL

## 2024-04-30 ENCOUNTER — OFFICE VISIT (OUTPATIENT)
Age: 44
End: 2024-04-30
Payer: COMMERCIAL

## 2024-04-30 VITALS
WEIGHT: 242 LBS | BODY MASS INDEX: 42.88 KG/M2 | DIASTOLIC BLOOD PRESSURE: 107 MMHG | HEIGHT: 63 IN | SYSTOLIC BLOOD PRESSURE: 184 MMHG

## 2024-04-30 DIAGNOSIS — Z12.31 SCREENING MAMMOGRAM FOR BREAST CANCER: ICD-10-CM

## 2024-04-30 DIAGNOSIS — Z12.31 SCREENING MAMMOGRAM FOR BREAST CANCER: Primary | ICD-10-CM

## 2024-04-30 DIAGNOSIS — N60.89 SEBACEOUS CYST OF SKIN OF BREAST, UNSPECIFIED LATERALITY: ICD-10-CM

## 2024-04-30 PROCEDURE — 3077F SYST BP >= 140 MM HG: CPT | Performed by: SURGERY

## 2024-04-30 PROCEDURE — 3080F DIAST BP >= 90 MM HG: CPT | Performed by: SURGERY

## 2024-04-30 PROCEDURE — 99202 OFFICE O/P NEW SF 15 MIN: CPT | Performed by: SURGERY

## 2024-04-30 PROCEDURE — 77063 BREAST TOMOSYNTHESIS BI: CPT

## 2024-04-30 NOTE — PROGRESS NOTES
are acutely infected at this time.  Lymphadenopathy:      Upper Body:      Right upper body: No axillary adenopathy.      Left upper body: No axillary adenopathy.            ASSESSMENT and PLAN   Diagnosis Orders   1. Screening mammogram for breast cancer  [facility] DIGITAL SCREEN BILATERAL      2. Sebaceous cyst of skin of breast, unspecified laterality        I spent 20 minutes reviewing previous notes and test results, face to face with the patient discussing diagnosis and treatment options, and documenting today's visit.    Bilateral breast skin sebaceous cysts  No acute infection at this time  Explained that this is confined to the skin.  The condition is not associated with breast cancer  Follow up for I and D if the cysts get large and do not drain spontaneously.  She had a mammogram today which was normal

## 2024-08-22 ENCOUNTER — OFFICE VISIT (OUTPATIENT)
Facility: CLINIC | Age: 44
End: 2024-08-22
Payer: COMMERCIAL

## 2024-08-22 VITALS
BODY MASS INDEX: 43.41 KG/M2 | DIASTOLIC BLOOD PRESSURE: 96 MMHG | HEART RATE: 70 BPM | RESPIRATION RATE: 18 BRPM | HEIGHT: 63 IN | OXYGEN SATURATION: 98 % | WEIGHT: 245 LBS | SYSTOLIC BLOOD PRESSURE: 154 MMHG

## 2024-08-22 DIAGNOSIS — E11.9 CONTROLLED TYPE 2 DIABETES MELLITUS WITHOUT COMPLICATION, WITHOUT LONG-TERM CURRENT USE OF INSULIN (HCC): Primary | ICD-10-CM

## 2024-08-22 DIAGNOSIS — E55.9 VITAMIN D DEFICIENCY: ICD-10-CM

## 2024-08-22 DIAGNOSIS — E78.2 MIXED HYPERLIPIDEMIA: ICD-10-CM

## 2024-08-22 DIAGNOSIS — I10 ESSENTIAL (PRIMARY) HYPERTENSION: ICD-10-CM

## 2024-08-22 DIAGNOSIS — M54.41 ACUTE RIGHT-SIDED LOW BACK PAIN WITH RIGHT-SIDED SCIATICA: ICD-10-CM

## 2024-08-22 LAB
25(OH)D3 SERPL-MCNC: 23 NG/ML (ref 30–100)
ALBUMIN SERPL-MCNC: 3.2 G/DL (ref 3.5–5)
ALBUMIN/GLOB SERPL: 0.8 (ref 1.1–2.2)
ALP SERPL-CCNC: 85 U/L (ref 45–117)
ALT SERPL-CCNC: 18 U/L (ref 12–78)
ANION GAP SERPL CALC-SCNC: 3 MMOL/L (ref 5–15)
AST SERPL-CCNC: 15 U/L (ref 15–37)
BILIRUB SERPL-MCNC: 0.4 MG/DL (ref 0.2–1)
BUN SERPL-MCNC: 18 MG/DL (ref 6–20)
BUN/CREAT SERPL: 43 (ref 12–20)
CALCIUM SERPL-MCNC: 8.9 MG/DL (ref 8.5–10.1)
CHLORIDE SERPL-SCNC: 108 MMOL/L (ref 97–108)
CHOLEST SERPL-MCNC: 156 MG/DL
CO2 SERPL-SCNC: 26 MMOL/L (ref 21–32)
CREAT SERPL-MCNC: 0.42 MG/DL (ref 0.55–1.02)
EST. AVERAGE GLUCOSE BLD GHB EST-MCNC: 154 MG/DL
GLOBULIN SER CALC-MCNC: 4.2 G/DL (ref 2–4)
GLUCOSE SERPL-MCNC: 159 MG/DL (ref 65–100)
HBA1C MFR BLD: 7 % (ref 4–5.6)
HDLC SERPL-MCNC: 42 MG/DL
HDLC SERPL: 3.7 (ref 0–5)
LDLC SERPL CALC-MCNC: 81.4 MG/DL (ref 0–100)
POTASSIUM SERPL-SCNC: 4.1 MMOL/L (ref 3.5–5.1)
PROT SERPL-MCNC: 7.4 G/DL (ref 6.4–8.2)
SODIUM SERPL-SCNC: 137 MMOL/L (ref 136–145)
TRIGL SERPL-MCNC: 163 MG/DL
VLDLC SERPL CALC-MCNC: 32.6 MG/DL

## 2024-08-22 PROCEDURE — 3080F DIAST BP >= 90 MM HG: CPT | Performed by: STUDENT IN AN ORGANIZED HEALTH CARE EDUCATION/TRAINING PROGRAM

## 2024-08-22 PROCEDURE — 99214 OFFICE O/P EST MOD 30 MIN: CPT | Performed by: STUDENT IN AN ORGANIZED HEALTH CARE EDUCATION/TRAINING PROGRAM

## 2024-08-22 PROCEDURE — 3077F SYST BP >= 140 MM HG: CPT | Performed by: STUDENT IN AN ORGANIZED HEALTH CARE EDUCATION/TRAINING PROGRAM

## 2024-08-22 PROCEDURE — 3051F HG A1C>EQUAL 7.0%<8.0%: CPT | Performed by: STUDENT IN AN ORGANIZED HEALTH CARE EDUCATION/TRAINING PROGRAM

## 2024-08-22 RX ORDER — NEBIVOLOL 5 MG/1
5 TABLET ORAL DAILY
Qty: 90 TABLET | Refills: 0 | Status: SHIPPED | OUTPATIENT
Start: 2024-08-22

## 2024-08-22 RX ORDER — CYCLOBENZAPRINE HCL 10 MG
10 TABLET ORAL EVERY 8 HOURS PRN
Qty: 30 TABLET | Refills: 0 | Status: SHIPPED | OUTPATIENT
Start: 2024-08-22 | End: 2024-09-01

## 2024-08-22 RX ORDER — MELOXICAM 7.5 MG/1
7.5 TABLET ORAL DAILY PRN
Qty: 30 TABLET | Refills: 0 | Status: SHIPPED | OUTPATIENT
Start: 2024-08-22

## 2024-08-22 RX ORDER — GLUCOSAMINE HCL/CHONDROITIN SU 500-400 MG
CAPSULE ORAL
Qty: 100 STRIP | Refills: 3 | Status: SHIPPED | OUTPATIENT
Start: 2024-08-22

## 2024-08-22 ASSESSMENT — ENCOUNTER SYMPTOMS
BACK PAIN: 1
NAUSEA: 0
ABDOMINAL PAIN: 0
SHORTNESS OF BREATH: 0
VOMITING: 0
RHINORRHEA: 0
COUGH: 0

## 2024-08-22 NOTE — PROGRESS NOTES
Room:  I have reviewed all needed documentation in preparation for visit. Verified patient by name and date of birth  Chief Complaint   Patient presents with    Follow-up     3 month         Vitals:    08/22/24 0854 08/22/24 0900   BP: (!) 152/99 (!) 154/96   Pulse: 70    Resp: 18    SpO2: 98%    Weight: 111.1 kg (245 lb)    Height: 1.588 m (5' 2.5\")         Health Maintenance Due   Topic Date Due    Pneumococcal 0-64 years Vaccine (1 of 2 - PCV) Never done    Varicella vaccine (1 of 2 - 13+ 2-dose series) Never done    HIV screen  Never done    Diabetic retinal exam  Never done    Hepatitis B vaccine (1 of 3 - 19+ 3-dose series) Never done    DTaP/Tdap/Td vaccine (1 - Tdap) Never done    COVID-19 Vaccine (4 - 2023-24 season) 09/01/2023    Flu vaccine (1) Never done        1. \"Have you been to the ER, urgent care clinic since your last visit?  Hospitalized since your last visit?\" Yes 05/2024 for cyst     2. \"Have you seen or consulted any other health care providers outside of the [facility] since your last visit?\" No      3. For patients aged 45-75: Has the patient had a colonoscopy / FIT/ Cologuard? N/A      If the patient is female:    4. For patients aged 40-74: Has the patient had a mammogram within the past 2 years? Yes      5. For patients aged 21-65: Has the patient had a pap smear? Yes

## 2024-08-22 NOTE — PROGRESS NOTES
Assessment/Plan:     Diagnoses and all orders for this visit:    Controlled type 2 diabetes mellitus without complication, without long-term current use of insulin (HCC)  -     blood glucose monitor strips; Use to check fasting glucose daily and as needed; One Touch (provide brand to match meter)  -     Comprehensive Metabolic Panel; Future  -     Hemoglobin A1C; Future  -Chronic.  Uncertain of control.  -Due for routine lab work including hemoglobin A1c  -Continue metformin 500 mg twice daily  -Advise scheduling routine diabetic eye exam  -Continue with diabetic diet    Essential (primary) hypertension  -     Comprehensive Metabolic Panel; Future  -     nebivolol (BYSTOLIC) 5 MG tablet; Take 1 tablet by mouth daily  -Chronic.  Uncontrolled.  -Continue current dosage of spironolactone, amlodipine, lisinopril and hydrochlorothiazide.  -Will start Bystolic 5 mg daily  -Continue monitoring home blood pressure readings  -Also requesting records from cardiology to see if a workup has been completed for possible secondary hypertension    Mixed hyperlipidemia  -     Lipid Panel; Future  -     Comprehensive Metabolic Panel; Future  -Chronic.  Uncertain of control.  -Patient is uncertain if she has been taking atorvastatin but will check with her medications at home  -Recommend continuing atorvastatin 20 mg daily  -Check lipid panel today    Vitamin D deficiency  -     Vitamin D 25 Hydroxy; Future  -History of vitamin D deficiency  -Not currently taking any vitamin D  -Repeat vitamin D level    Acute right-sided low back pain with right-sided sciatica  -     meloxicam (MOBIC) 7.5 MG tablet; Take 1 tablet by mouth daily as needed for Pain  -     cyclobenzaprine (FLEXERIL) 10 MG tablet; Take 1 tablet by mouth every 8 hours as needed for Muscle spasms  -Acute low back pain with sciatica noted x 2 weeks  -No red flag signs or symptoms  -Will try meloxicam and Flexeril as needed.  Discussed medication risks and side effects.

## 2024-08-30 ENCOUNTER — TELEPHONE (OUTPATIENT)
Age: 44
End: 2024-08-30

## 2024-08-30 NOTE — TELEPHONE ENCOUNTER
Called the pt to reschedule missed appt. Pt's spouse request to not reschedule. He said she no longer have insurance. CHRISTIANE

## 2024-11-24 DIAGNOSIS — E11.9 CONTROLLED TYPE 2 DIABETES MELLITUS WITHOUT COMPLICATION, WITHOUT LONG-TERM CURRENT USE OF INSULIN (HCC): ICD-10-CM

## 2024-11-24 DIAGNOSIS — M54.41 ACUTE RIGHT-SIDED LOW BACK PAIN WITH RIGHT-SIDED SCIATICA: ICD-10-CM

## 2024-11-26 RX ORDER — MELOXICAM 7.5 MG/1
7.5 TABLET ORAL DAILY PRN
Qty: 30 TABLET | Refills: 0 | OUTPATIENT
Start: 2024-11-26

## 2024-11-26 RX ORDER — CYCLOBENZAPRINE HCL 10 MG
TABLET ORAL
Qty: 30 TABLET | Refills: 0 | OUTPATIENT
Start: 2024-11-26

## 2024-12-18 ENCOUNTER — APPOINTMENT (OUTPATIENT)
Facility: HOSPITAL | Age: 44
End: 2024-12-18
Payer: MEDICAID

## 2024-12-18 ENCOUNTER — HOSPITAL ENCOUNTER (EMERGENCY)
Facility: HOSPITAL | Age: 44
Discharge: HOME | End: 2024-12-18
Attending: EMERGENCY MEDICINE
Payer: MEDICAID

## 2024-12-18 VITALS
HEIGHT: 64 IN | OXYGEN SATURATION: 100 % | HEART RATE: 59 BPM | WEIGHT: 240 LBS | BODY MASS INDEX: 40.97 KG/M2 | RESPIRATION RATE: 16 BRPM | SYSTOLIC BLOOD PRESSURE: 182 MMHG | TEMPERATURE: 98.5 F | DIASTOLIC BLOOD PRESSURE: 97 MMHG

## 2024-12-18 DIAGNOSIS — R51.9 ACUTE NONINTRACTABLE HEADACHE, UNSPECIFIED HEADACHE TYPE: ICD-10-CM

## 2024-12-18 DIAGNOSIS — I10 HYPERTENSION, UNSPECIFIED TYPE: Primary | ICD-10-CM

## 2024-12-18 LAB
ALBUMIN SERPL-MCNC: 3.1 G/DL (ref 3.5–5)
ALBUMIN/GLOB SERPL: 0.7 (ref 1.1–2.2)
ALP SERPL-CCNC: 88 U/L (ref 45–117)
ALT SERPL-CCNC: 15 U/L (ref 12–78)
ANION GAP SERPL CALC-SCNC: 9 MMOL/L (ref 2–12)
APPEARANCE UR: CLEAR
AST SERPL-CCNC: 13 U/L (ref 15–37)
BACTERIA URNS QL MICRO: ABNORMAL /HPF
BASOPHILS # BLD: 0 K/UL (ref 0–0.1)
BASOPHILS NFR BLD: 1 % (ref 0–1)
BILIRUB SERPL-MCNC: 0.3 MG/DL (ref 0.2–1)
BILIRUB UR QL: NEGATIVE
BUN SERPL-MCNC: 14 MG/DL (ref 6–20)
BUN/CREAT SERPL: 18 (ref 12–20)
CALCIUM SERPL-MCNC: 8.8 MG/DL (ref 8.5–10.1)
CHLORIDE SERPL-SCNC: 103 MMOL/L (ref 97–108)
CO2 SERPL-SCNC: 26 MMOL/L (ref 21–32)
COLOR UR: ABNORMAL
CREAT SERPL-MCNC: 0.79 MG/DL (ref 0.55–1.02)
DIFFERENTIAL METHOD BLD: ABNORMAL
EKG ATRIAL RATE: 63 BPM
EKG DIAGNOSIS: NORMAL
EKG P AXIS: 71 DEGREES
EKG P-R INTERVAL: 134 MS
EKG Q-T INTERVAL: 414 MS
EKG QRS DURATION: 76 MS
EKG QTC CALCULATION (BAZETT): 423 MS
EKG R AXIS: 28 DEGREES
EKG T AXIS: 140 DEGREES
EKG VENTRICULAR RATE: 63 BPM
EOSINOPHIL # BLD: 0.2 K/UL (ref 0–0.4)
EOSINOPHIL NFR BLD: 3 % (ref 0–7)
EPITH CASTS URNS QL MICRO: ABNORMAL /LPF
ERYTHROCYTE [DISTWIDTH] IN BLOOD BY AUTOMATED COUNT: 15.7 % (ref 11.5–14.5)
GLOBULIN SER CALC-MCNC: 4.3 G/DL (ref 2–4)
GLUCOSE SERPL-MCNC: 175 MG/DL (ref 65–100)
GLUCOSE UR STRIP.AUTO-MCNC: NEGATIVE MG/DL
HCG UR QL: NEGATIVE
HCT VFR BLD AUTO: 28.7 % (ref 35–47)
HGB BLD-MCNC: 8.9 G/DL (ref 11.5–16)
HGB UR QL STRIP: NEGATIVE
IMM GRANULOCYTES # BLD AUTO: 0 K/UL (ref 0–0.04)
IMM GRANULOCYTES NFR BLD AUTO: 0 % (ref 0–0.5)
KETONES UR QL STRIP.AUTO: NEGATIVE MG/DL
LEUKOCYTE ESTERASE UR QL STRIP.AUTO: NEGATIVE
LYMPHOCYTES # BLD: 1.9 K/UL (ref 0.8–3.5)
LYMPHOCYTES NFR BLD: 24 % (ref 12–49)
MCH RBC QN AUTO: 24.4 PG (ref 26–34)
MCHC RBC AUTO-ENTMCNC: 31 G/DL (ref 30–36.5)
MCV RBC AUTO: 78.6 FL (ref 80–99)
MONOCYTES # BLD: 0.4 K/UL (ref 0–1)
MONOCYTES NFR BLD: 5 % (ref 5–13)
NEUTS SEG # BLD: 5.2 K/UL (ref 1.8–8)
NEUTS SEG NFR BLD: 67 % (ref 32–75)
NITRITE UR QL STRIP.AUTO: NEGATIVE
NRBC # BLD: 0 K/UL (ref 0–0.01)
NRBC BLD-RTO: 0 PER 100 WBC
PH UR STRIP: 5.5 (ref 5–8)
PLATELET # BLD AUTO: 453 K/UL (ref 150–400)
PMV BLD AUTO: 9.6 FL (ref 8.9–12.9)
POTASSIUM SERPL-SCNC: 3.7 MMOL/L (ref 3.5–5.1)
PROT SERPL-MCNC: 7.4 G/DL (ref 6.4–8.2)
PROT UR STRIP-MCNC: NEGATIVE MG/DL
RBC # BLD AUTO: 3.65 M/UL (ref 3.8–5.2)
RBC #/AREA URNS HPF: ABNORMAL /HPF (ref 0–5)
SODIUM SERPL-SCNC: 138 MMOL/L (ref 136–145)
SP GR UR REFRACTOMETRY: 1.02 (ref 1–1.03)
UROBILINOGEN UR QL STRIP.AUTO: 0.2 EU/DL (ref 0.2–1)
WBC # BLD AUTO: 7.8 K/UL (ref 3.6–11)
WBC URNS QL MICRO: ABNORMAL /HPF (ref 0–4)

## 2024-12-18 PROCEDURE — 99284 EMERGENCY DEPT VISIT MOD MDM: CPT

## 2024-12-18 PROCEDURE — 81025 URINE PREGNANCY TEST: CPT

## 2024-12-18 PROCEDURE — 81001 URINALYSIS AUTO W/SCOPE: CPT

## 2024-12-18 PROCEDURE — 36415 COLL VENOUS BLD VENIPUNCTURE: CPT

## 2024-12-18 PROCEDURE — 93010 ELECTROCARDIOGRAM REPORT: CPT | Performed by: INTERNAL MEDICINE

## 2024-12-18 PROCEDURE — 70450 CT HEAD/BRAIN W/O DYE: CPT

## 2024-12-18 PROCEDURE — 85025 COMPLETE CBC W/AUTO DIFF WBC: CPT

## 2024-12-18 PROCEDURE — 80053 COMPREHEN METABOLIC PANEL: CPT

## 2024-12-18 PROCEDURE — 96374 THER/PROPH/DIAG INJ IV PUSH: CPT

## 2024-12-18 PROCEDURE — 6370000000 HC RX 637 (ALT 250 FOR IP)

## 2024-12-18 PROCEDURE — 6360000002 HC RX W HCPCS

## 2024-12-18 PROCEDURE — 93005 ELECTROCARDIOGRAM TRACING: CPT

## 2024-12-18 RX ORDER — BUTALBITAL, ACETAMINOPHEN AND CAFFEINE 50; 325; 40 MG/1; MG/1; MG/1
1 TABLET ORAL ONCE
Status: COMPLETED | OUTPATIENT
Start: 2024-12-18 | End: 2024-12-18

## 2024-12-18 RX ORDER — LABETALOL HYDROCHLORIDE 5 MG/ML
20 INJECTION, SOLUTION INTRAVENOUS ONCE
Status: COMPLETED | OUTPATIENT
Start: 2024-12-18 | End: 2024-12-18

## 2024-12-18 RX ORDER — LABETALOL HYDROCHLORIDE 5 MG/ML
10 INJECTION, SOLUTION INTRAVENOUS ONCE
Status: DISCONTINUED | OUTPATIENT
Start: 2024-12-18 | End: 2024-12-18

## 2024-12-18 RX ADMIN — LABETALOL HYDROCHLORIDE 20 MG: 5 INJECTION INTRAVENOUS at 13:42

## 2024-12-18 RX ADMIN — BUTALBITAL, ACETAMINOPHEN, AND CAFFEINE 1 TABLET: 325; 50; 40 TABLET ORAL at 13:09

## 2024-12-18 ASSESSMENT — PAIN DESCRIPTION - ORIENTATION: ORIENTATION_2: LOWER

## 2024-12-18 ASSESSMENT — PAIN DESCRIPTION - PAIN TYPE: TYPE: ACUTE PAIN

## 2024-12-18 ASSESSMENT — PAIN SCALES - GENERAL
PAINLEVEL_OUTOF10: 6
PAINLEVEL_OUTOF10: 8

## 2024-12-18 ASSESSMENT — PAIN DESCRIPTION - DESCRIPTORS
DESCRIPTORS: THROBBING
DESCRIPTORS: ACHING
DESCRIPTORS_2: ACHING

## 2024-12-18 ASSESSMENT — PAIN DESCRIPTION - LOCATION
LOCATION: HEAD
LOCATION_2: BACK
LOCATION: HEAD

## 2024-12-18 ASSESSMENT — PAIN - FUNCTIONAL ASSESSMENT
PAIN_FUNCTIONAL_ASSESSMENT: ACTIVITIES ARE NOT PREVENTED
PAIN_FUNCTIONAL_ASSESSMENT_SITE2: ACTIVITIES ARE NOT PREVENTED

## 2024-12-18 ASSESSMENT — PAIN DESCRIPTION - FREQUENCY: FREQUENCY: CONTINUOUS

## 2024-12-18 ASSESSMENT — ENCOUNTER SYMPTOMS
SHORTNESS OF BREATH: 0
NAUSEA: 0
BACK PAIN: 1
VOMITING: 0

## 2024-12-18 ASSESSMENT — PAIN DESCRIPTION - INTENSITY: RATING_2: 5

## 2024-12-18 NOTE — ED TRIAGE NOTES
Pt ambulated to treatment area with a steady gait. Pt here with headache, dizziness that she attributes to her BP being high. Pt reports she has been taking her medications. Pt also reports numbness in her hands and feet that started with the headache and dizziness. Symptoms started at 7 am yesterday. Generalized weakness and fatigue. Pt also C/O lower back pain with radiation into the left lower back x 1 week. Denies any N/V/D, visual changes. + urinary pain.

## 2024-12-18 NOTE — ED NOTES
Pt returned from imaging, Urine sent. POC preg negative.  Pt on stretcher in position of comfort, declined blanket at this time. Call bell in reach

## 2024-12-18 NOTE — ED PROVIDER NOTES
[facility] EMERGENCY DEPT  EMERGENCY DEPARTMENT ENCOUNTER      Pt Name: Sarah Wyman  MRN: [id number]  Birthdate 1980  Date of evaluation: 12/18/2024  Provider: SILVA Parisi    CHIEF COMPLAINT       Chief Complaint   Patient presents with    Headache    Dizziness    Back Pain         HISTORY OF PRESENT ILLNESS   (Location/Symptom, Timing/Onset, Context/Setting, Quality, Duration, Modifying Factors, Severity)  Note limiting factors.   Sarah Wyman is a 44 y.o. female with hypertension and hyperlipidemia who presents to the emergency department complaining of low back pain, dysuria, headache, dizziness since 7 AM yesterday.  Patient reports low back pain is located on the right side and worsens with movement.  She reports the pain is a 5 out of 10 reports taking Tylenol with some relief.  She denies any radiation of the back pain.  Patient also reports she has been having some pain with urination but denies any increased frequency or hematuria.  She reports she has had a headache and dizziness with movement but denies any visual disturbances or weakness at this time.  She reports also having some numbness to her hands and her feet. Denies any fever, chills, nausea, vomiting, abdominal pain, chest pain, shortness of breath.  Denies any falls or injuries to the back as well as to the head.  Patient reports she is on a few blood pressure medications and she reports she is adherent to her medication regimen as prescribed.              Review of External Medical Records:     Nursing Notes were reviewed.    REVIEW OF SYSTEMS    (2-9 systems for level 4, 10 or more for level 5)     Review of Systems   Constitutional:  Negative for fever.   HENT:  Negative for congestion.    Respiratory:  Negative for shortness of breath.    Cardiovascular:  Negative for chest pain.   Gastrointestinal:  Negative for nausea and vomiting.   Genitourinary:  Positive for dysuria.   Musculoskeletal:  Positive for back pain. Negative for

## 2024-12-18 NOTE — ED NOTES
The patient was discharged home by provider in stable condition with family. The patient is alert and oriented, in no respiratory distress. The patient's diagnosis, condition and treatment were explained. The patient expressed understanding and denies any questions or concerns at this time. Patient leaves treatment area ambulatory with all personal belongings.

## 2024-12-18 NOTE — DISCHARGE INSTRUCTIONS
As discussed today during ED visit, CT of the head showed no acute abnormalities.  UA was unremarkable.  CBC and CMP unremarkable.  Labetalol was given during ED course and blood pressure did decrease however we recommend at this time following up with your primary care doctor to discuss medication regimen.  Please monitor blood pressure at home intake throughout the day and log the readings.    [facility]el[facility] Counseling:  I discussed with the patient the risks of ustekinumab including but not limited to immunosuppression, malignancy, posterior leukoencephalopathy syndrome, and serious infections.  The patient understands that monitoring is required including a PPD at baseline and must alert us or the primary physician if symptoms of infection or other concerning signs are noted.

## 2024-12-19 ENCOUNTER — OFFICE VISIT (OUTPATIENT)
Facility: CLINIC | Age: 44
End: 2024-12-19
Payer: MEDICAID

## 2024-12-19 DIAGNOSIS — E78.2 MIXED HYPERLIPIDEMIA: ICD-10-CM

## 2024-12-19 DIAGNOSIS — I10 ESSENTIAL (PRIMARY) HYPERTENSION: Primary | ICD-10-CM

## 2024-12-19 DIAGNOSIS — R51.9 ACUTE NONINTRACTABLE HEADACHE, UNSPECIFIED HEADACHE TYPE: ICD-10-CM

## 2024-12-19 DIAGNOSIS — E11.9 TYPE 2 DIABETES MELLITUS WITHOUT COMPLICATION, WITHOUT LONG-TERM CURRENT USE OF INSULIN (HCC): ICD-10-CM

## 2024-12-19 PROCEDURE — 3080F DIAST BP >= 90 MM HG: CPT | Performed by: PHYSICIAN ASSISTANT

## 2024-12-19 PROCEDURE — 99214 OFFICE O/P EST MOD 30 MIN: CPT | Performed by: PHYSICIAN ASSISTANT

## 2024-12-19 PROCEDURE — 3077F SYST BP >= 140 MM HG: CPT | Performed by: PHYSICIAN ASSISTANT

## 2024-12-19 PROCEDURE — 3051F HG A1C>EQUAL 7.0%<8.0%: CPT | Performed by: PHYSICIAN ASSISTANT

## 2024-12-19 RX ORDER — HYDRALAZINE HYDROCHLORIDE 10 MG/1
10 TABLET, FILM COATED ORAL 3 TIMES DAILY
Qty: 90 TABLET | Refills: 2 | Status: SHIPPED | OUTPATIENT
Start: 2024-12-19 | End: 2025-12-19

## 2024-12-19 SDOH — ECONOMIC STABILITY: FOOD INSECURITY: WITHIN THE PAST 12 MONTHS, THE FOOD YOU BOUGHT JUST DIDN'T LAST AND YOU DIDN'T HAVE MONEY TO GET MORE.: NEVER TRUE

## 2024-12-19 SDOH — ECONOMIC STABILITY: INCOME INSECURITY: HOW HARD IS IT FOR YOU TO PAY FOR THE VERY BASICS LIKE FOOD, HOUSING, MEDICAL CARE, AND HEATING?: NOT HARD AT ALL

## 2024-12-19 SDOH — ECONOMIC STABILITY: FOOD INSECURITY: WITHIN THE PAST 12 MONTHS, YOU WORRIED THAT YOUR FOOD WOULD RUN OUT BEFORE YOU GOT MONEY TO BUY MORE.: NEVER TRUE

## 2024-12-19 ASSESSMENT — PATIENT HEALTH QUESTIONNAIRE - PHQ9
SUM OF ALL RESPONSES TO PHQ QUESTIONS 1-9: 0
SUM OF ALL RESPONSES TO PHQ QUESTIONS 1-9: 0
2. FEELING DOWN, DEPRESSED OR HOPELESS: NOT AT ALL
SUM OF ALL RESPONSES TO PHQ9 QUESTIONS 1 & 2: 0
1. LITTLE INTEREST OR PLEASURE IN DOING THINGS: NOT AT ALL
SUM OF ALL RESPONSES TO PHQ QUESTIONS 1-9: 0
SUM OF ALL RESPONSES TO PHQ QUESTIONS 1-9: 0

## 2024-12-19 NOTE — PROGRESS NOTES
1. Have you been to the ER, urgent care clinic since your last visit?  Hospitalized since your last visit?Yes When: 12/18/2024 Where: [facility] Reason for visit: HYN      2. Have you seen or consulted any other health care providers outside of the [facility] since your last visit?  Include any pap smears or colon screening. No    Chief Complaint   Patient presents with    Follow-Up from Hospital            Health Maintenance Due   Topic Date Due    Pneumococcal 0-64 years Vaccine (1 of 2 - PCV) Never done    Varicella vaccine (1 of 2 - 13+ 2-dose series) Never done    HIV screen  Never done    Diabetic retinal exam  Never done    Hepatitis B vaccine (1 of 3 - 19+ 3-dose series) Never done    DTaP/Tdap/Td vaccine (1 - Tdap) Never done    Flu vaccine (1) Never done    COVID-19 Vaccine (1 - 2023-24 season) Never done    Diabetic foot exam  01/15/2025    Diabetic Alb to Cr ratio (uACR) test  01/15/2025     PHQ-9 Total Score: 0 (12/19/2024  8:11 AM)        12/19/2024     8:00 AM   AMB Abuse Screening   Do you ever feel afraid of your partner? N   Are you in a relationship with someone who physically or mentally threatens you? N   Is it safe for you to go home? Y     Vitals:    12/19/24 0811   BP: (!) 156/97   Pulse: 75   Resp: 16   Temp: 97.5 °F (36.4 °C)   SpO2: 99%

## 2024-12-19 NOTE — PROGRESS NOTES
History of present illness:Sarah Wyman is a 44 y.o. female presenting for Follow-Up from Hospital (/)    Ms. Wyman is here with his son for follow-up ER.     Patient went to recently ER on December 18 for elevated blood pressure and bad headaches.  Her blood pressure was 194/108 at ER.  She was treated for elevated blood pressure with IV labetalol.  Her EKG came out fine.  She had a CT scan of head, which came out fine.  She had a workup at ER which came out fine.  Discussed briefly with patient about ER workup today.  See ER visit copy for more details in epic.    History of hypertension.  Patient currently takes lisinopril, diastolic, hydrochlorothiazide, spironolactone, amlodipine for blood pressure control.  Her blood pressure is 156/97, repeated /90 pulse 75.  Patient takes her blood pressure on daily basis.  Discussed with patient about any previous history of heart issues or seeing any cardiology, patient denies seeing any cardiology currently.  Patient denies any chest pain, shortness of breath or palpitation at this point.  Her home blood pressure mostly runs in >150s/>90s per patient.  Discussed with patient about adding hydralazine temporarily lets see if that works to control her blood pressure.  Patient is okay with that.    Patient has history of diabetes.  Her last A1c was 7.0% in August 2024.    History of hyperlipidemia.  Patient takes Lipitor 20 mg for cholesterol control.    Patient has upcoming appointment with primary care doctor in 2 weeks.    Patient denies any other concerns today.    Review of Systems   Constitutional:  Negative for chills, fatigue and fever.   HENT:  Negative for congestion, ear pain, rhinorrhea and sore throat.    Eyes:  Negative for visual disturbance.   Respiratory:  Negative for cough and shortness of breath.    Cardiovascular:  Negative for chest pain and palpitations.   Gastrointestinal:  Negative for diarrhea, nausea and vomiting.   Genitourinary:  Negative

## 2024-12-21 VITALS
OXYGEN SATURATION: 99 % | DIASTOLIC BLOOD PRESSURE: 90 MMHG | WEIGHT: 236 LBS | HEIGHT: 64 IN | TEMPERATURE: 97.5 F | BODY MASS INDEX: 40.29 KG/M2 | HEART RATE: 75 BPM | RESPIRATION RATE: 16 BRPM | SYSTOLIC BLOOD PRESSURE: 150 MMHG

## 2024-12-21 PROBLEM — E11.9 TYPE 2 DIABETES MELLITUS WITHOUT COMPLICATION, WITHOUT LONG-TERM CURRENT USE OF INSULIN (HCC): Status: ACTIVE | Noted: 2022-12-15

## 2024-12-21 ASSESSMENT — ENCOUNTER SYMPTOMS
COUGH: 0
SORE THROAT: 0
NAUSEA: 0
SHORTNESS OF BREATH: 0
DIARRHEA: 0
VOMITING: 0
RHINORRHEA: 0

## 2024-12-24 ENCOUNTER — TELEPHONE (OUTPATIENT)
Facility: CLINIC | Age: 44
End: 2024-12-24

## 2024-12-24 DIAGNOSIS — E11.9 CONTROLLED TYPE 2 DIABETES MELLITUS WITHOUT COMPLICATION, WITHOUT LONG-TERM CURRENT USE OF INSULIN (HCC): ICD-10-CM

## 2024-12-24 DIAGNOSIS — I10 ESSENTIAL (PRIMARY) HYPERTENSION: ICD-10-CM

## 2024-12-24 RX ORDER — GLUCOSAMINE HCL/CHONDROITIN SU 500-400 MG
CAPSULE ORAL
Qty: 100 STRIP | Refills: 3 | Status: SHIPPED | OUTPATIENT
Start: 2024-12-24

## 2024-12-24 RX ORDER — BLOOD-GLUCOSE METER
1 KIT MISCELLANEOUS DAILY
Qty: 1 KIT | Refills: 0 | Status: SHIPPED | OUTPATIENT
Start: 2024-12-24

## 2024-12-24 NOTE — TELEPHONE ENCOUNTER
Pt stated that PCP forgot to place order for Glucose monitor set with strips and lancelles. If approved please send Rx to [facility]

## 2024-12-26 RX ORDER — LISINOPRIL 40 MG/1
40 TABLET ORAL DAILY
Qty: 90 TABLET | Refills: 1 | Status: SHIPPED | OUTPATIENT
Start: 2024-12-26

## 2024-12-26 NOTE — TELEPHONE ENCOUNTER
PCP: Porsha Griffin MD    Last appt: [unfilled]  Future Appointments   Date Time Provider Department Center   12/30/2024  2:20 PM Porsha Griffin MD [facility] DEP       Requested Prescriptions     Pending Prescriptions Disp Refills    lisinopril (PRINIVIL;ZESTRIL) 40 MG tablet [Pharmacy Med Name: LISINOPRIL 40 MG TABLET] 90 tablet 1     Sig: TAKE 1 TABLET BY MOUTH EVERY DAY       Prior labs and Blood pressures:  BP Readings from Last 3 Encounters:   12/21/24 (!) 150/90   12/18/24 (!) 182/97   08/22/24 (!) 154/96     Lab Results   Component Value Date/Time     12/18/2024 01:27 PM    K 3.7 12/18/2024 01:27 PM     12/18/2024 01:27 PM    CO2 26 12/18/2024 01:27 PM    BUN 14 12/18/2024 01:27 PM    GFRAA >60 11/19/2019 11:27 AM     No results found for: \"HBA1C\", \"[id number]\"  Lab Results   Component Value Date/Time    CHOL 156 08/22/2024 09:40 AM    HDL 42 08/22/2024 09:40 AM    LDL 81.4 08/22/2024 09:40 AM     04/22/2024 11:34 AM    VLDL 32.6 08/22/2024 09:40 AM     No results found for: \"VITD3\"    Lab Results   Component Value Date/Time    TSH 0.68 04/22/2024 11:34 AM

## 2025-01-06 ENCOUNTER — HOSPITAL ENCOUNTER (OUTPATIENT)
Facility: HOSPITAL | Age: 45
Discharge: HOME | End: 2025-01-09
Payer: MEDICAID

## 2025-01-06 ENCOUNTER — OFFICE VISIT (OUTPATIENT)
Facility: CLINIC | Age: 45
End: 2025-01-06
Payer: MEDICAID

## 2025-01-06 VITALS
RESPIRATION RATE: 16 BRPM | HEART RATE: 99 BPM | OXYGEN SATURATION: 98 % | TEMPERATURE: 99 F | SYSTOLIC BLOOD PRESSURE: 137 MMHG | WEIGHT: 231.8 LBS | DIASTOLIC BLOOD PRESSURE: 87 MMHG | BODY MASS INDEX: 39.57 KG/M2 | HEIGHT: 64 IN

## 2025-01-06 DIAGNOSIS — I10 ESSENTIAL (PRIMARY) HYPERTENSION: Primary | ICD-10-CM

## 2025-01-06 DIAGNOSIS — D50.9 IRON DEFICIENCY ANEMIA, UNSPECIFIED IRON DEFICIENCY ANEMIA TYPE: ICD-10-CM

## 2025-01-06 DIAGNOSIS — G89.29 CHRONIC RIGHT-SIDED LOW BACK PAIN WITHOUT SCIATICA: ICD-10-CM

## 2025-01-06 DIAGNOSIS — E11.9 TYPE 2 DIABETES MELLITUS WITHOUT COMPLICATION, WITHOUT LONG-TERM CURRENT USE OF INSULIN (HCC): ICD-10-CM

## 2025-01-06 DIAGNOSIS — M54.50 CHRONIC RIGHT-SIDED LOW BACK PAIN WITHOUT SCIATICA: ICD-10-CM

## 2025-01-06 DIAGNOSIS — E55.9 VITAMIN D DEFICIENCY: ICD-10-CM

## 2025-01-06 PROCEDURE — 3075F SYST BP GE 130 - 139MM HG: CPT | Performed by: STUDENT IN AN ORGANIZED HEALTH CARE EDUCATION/TRAINING PROGRAM

## 2025-01-06 PROCEDURE — 3079F DIAST BP 80-89 MM HG: CPT | Performed by: STUDENT IN AN ORGANIZED HEALTH CARE EDUCATION/TRAINING PROGRAM

## 2025-01-06 PROCEDURE — 72100 X-RAY EXAM L-S SPINE 2/3 VWS: CPT

## 2025-01-06 PROCEDURE — 99214 OFFICE O/P EST MOD 30 MIN: CPT | Performed by: STUDENT IN AN ORGANIZED HEALTH CARE EDUCATION/TRAINING PROGRAM

## 2025-01-06 RX ORDER — CYCLOBENZAPRINE HCL 10 MG
10 TABLET ORAL EVERY 8 HOURS PRN
Qty: 30 TABLET | Refills: 0 | Status: SHIPPED | OUTPATIENT
Start: 2025-01-06

## 2025-01-06 RX ORDER — NEBIVOLOL 10 MG/1
10 TABLET ORAL DAILY
Qty: 90 TABLET | Refills: 0 | Status: SHIPPED | OUTPATIENT
Start: 2025-01-06

## 2025-01-06 RX ORDER — LIDOCAINE 50 MG/G
1 PATCH TOPICAL DAILY PRN
Qty: 30 PATCH | Refills: 0 | Status: SHIPPED | OUTPATIENT
Start: 2025-01-06 | End: 2025-02-05

## 2025-01-06 ASSESSMENT — PATIENT HEALTH QUESTIONNAIRE - PHQ9
1. LITTLE INTEREST OR PLEASURE IN DOING THINGS: NOT AT ALL
2. FEELING DOWN, DEPRESSED OR HOPELESS: NOT AT ALL
SUM OF ALL RESPONSES TO PHQ QUESTIONS 1-9: 0
SUM OF ALL RESPONSES TO PHQ QUESTIONS 1-9: 0
SUM OF ALL RESPONSES TO PHQ9 QUESTIONS 1 & 2: 0
SUM OF ALL RESPONSES TO PHQ QUESTIONS 1-9: 0
SUM OF ALL RESPONSES TO PHQ QUESTIONS 1-9: 0

## 2025-01-06 ASSESSMENT — ENCOUNTER SYMPTOMS
NAUSEA: 0
COUGH: 0
BACK PAIN: 1
RHINORRHEA: 0
ABDOMINAL PAIN: 0
SHORTNESS OF BREATH: 0
VOMITING: 0

## 2025-01-06 NOTE — PROGRESS NOTES
Assessment/Plan:     Diagnoses and all orders for this visit:    Essential (primary) hypertension  -     nebivolol (BYSTOLIC) 10 MG tablet; Take 1 tablet by mouth daily  -     Albumin/Creatinine Ratio, Urine; Future  -     Aldosterone & Renin, Direct with Ratio; Future  -     Metanephrines, urine, 24 hour; Future  -     Vascular duplex renal arterial complete; Future  -Chronic.  Persistent.  Home readings note elevation but has shown improvement  -Patient only able to take the hydralazine twice daily rather than 3 times daily  -Therefore continue lisinopril 40 mg daily, hydralazine 10 mg twice daily, hydrochlorothiazide 25 mg daily, spironolactone 25 mg daily and amlodipine 10 mg daily  -Increase nebivolol from 5 to 10 mg daily  -Also will do additional lab work and evaluation for secondary hypertension  -Keep home blood pressure log  -Follow-up in 1 month for reevaluation or sooner if needed    Type 2 diabetes mellitus without complication, without long-term current use of insulin (formerly [facility])  -     Albumin/Creatinine Ratio, Urine; Future  -     Hemoglobin A1C; Future  -Chronic.  Presumed stable.  -Continue metformin 500 mg twice daily  -Ozempic previously cost prohibitive  -Continue diabetic diet  -Advise scheduling routine diabetic eye exam  -Check hemoglobin A1c    Vitamin D deficiency  -     Vitamin D 25 Hydroxy; Future  -History of vitamin D deficiency  -Repeat vitamin D level    Iron deficiency anemia, unspecified iron deficiency anemia type  -     Iron and TIBC; Future  -     Ferritin; Future  -History of iron deficiency anemia thought to be secondary to heavy menstrual cycles  -Has established with hematology and supposed to be on iron twice daily  -Recheck lab work  -Advised patient to schedule follow-up appointment with her hematologist    Chronic right-sided low back pain without sciatica  -     cyclobenzaprine (FLEXERIL) 10 MG tablet; Take 1 tablet by mouth every 8 hours as needed for Muscle spasms  -

## 2025-01-06 NOTE — PROGRESS NOTES
Identified pt with two pt identifiers(name and ).      Coordination of Care Questionnaire:  :   1. \"Have you been to the ER, urgent care clinic since your last visit?  No  Hospitalized since your last visit?\" No     2. \"Have you seen or consulted any other health care providers outside of the [facility] since your last visit?\"  No     I have received verbal consent from  patient  to discuss any/all medical information while they are present in the room.    Reviewed record in preparation for visit and have obtained necessary documentation.  Medication reconciliation up to date and corrected with patient at this time.

## 2025-01-07 LAB
25(OH)D3 SERPL-MCNC: 31.9 NG/ML (ref 30–100)
CREAT UR-MCNC: 298 MG/DL
EST. AVERAGE GLUCOSE BLD GHB EST-MCNC: 154 MG/DL
FERRITIN SERPL-MCNC: 75 NG/ML (ref 8–252)
HBA1C MFR BLD: 7 % (ref 4–5.6)
IRON SATN MFR SERPL: 30 % (ref 20–50)
IRON SERPL-MCNC: 126 UG/DL (ref 35–150)
MICROALBUMIN UR-MCNC: 79.5 MG/DL
MICROALBUMIN/CREAT UR-RTO: 267 MG/G (ref 0–30)
TIBC SERPL-MCNC: 417 UG/DL (ref 250–450)

## 2025-01-09 LAB — ALDOST SERPL-MCNC: 3.5 NG/DL (ref 0–30)

## 2025-01-21 LAB
ALDOST SERPL-MCNC: 3.5 NG/DL (ref 0–30)
ALDOST/RENIN PLAS-RTO: 2.5 (ref 0–30)
RENIN PLAS-CCNC: 1.4 NG/ML/HR (ref 0.17–5.38)

## 2025-06-09 ENCOUNTER — OFFICE VISIT (OUTPATIENT)
Facility: CLINIC | Age: 45
End: 2025-06-09
Payer: COMMERCIAL

## 2025-06-09 VITALS
TEMPERATURE: 97.8 F | WEIGHT: 228.8 LBS | RESPIRATION RATE: 17 BRPM | SYSTOLIC BLOOD PRESSURE: 143 MMHG | BODY MASS INDEX: 39.06 KG/M2 | HEART RATE: 85 BPM | HEIGHT: 64 IN | DIASTOLIC BLOOD PRESSURE: 87 MMHG | OXYGEN SATURATION: 100 %

## 2025-06-09 DIAGNOSIS — E11.9 TYPE 2 DIABETES MELLITUS WITHOUT COMPLICATION, WITHOUT LONG-TERM CURRENT USE OF INSULIN (HCC): ICD-10-CM

## 2025-06-09 DIAGNOSIS — M54.50 CHRONIC RIGHT-SIDED LOW BACK PAIN WITHOUT SCIATICA: ICD-10-CM

## 2025-06-09 DIAGNOSIS — I10 ESSENTIAL (PRIMARY) HYPERTENSION: ICD-10-CM

## 2025-06-09 DIAGNOSIS — G89.29 CHRONIC RIGHT-SIDED LOW BACK PAIN WITHOUT SCIATICA: ICD-10-CM

## 2025-06-09 DIAGNOSIS — E78.2 MIXED HYPERLIPIDEMIA: ICD-10-CM

## 2025-06-09 DIAGNOSIS — D50.9 IRON DEFICIENCY ANEMIA, UNSPECIFIED IRON DEFICIENCY ANEMIA TYPE: ICD-10-CM

## 2025-06-09 DIAGNOSIS — E11.9 TYPE 2 DIABETES MELLITUS WITHOUT COMPLICATION, WITHOUT LONG-TERM CURRENT USE OF INSULIN (HCC): Primary | ICD-10-CM

## 2025-06-09 PROCEDURE — 3044F HG A1C LEVEL LT 7.0%: CPT | Performed by: STUDENT IN AN ORGANIZED HEALTH CARE EDUCATION/TRAINING PROGRAM

## 2025-06-09 PROCEDURE — 99214 OFFICE O/P EST MOD 30 MIN: CPT | Performed by: STUDENT IN AN ORGANIZED HEALTH CARE EDUCATION/TRAINING PROGRAM

## 2025-06-09 PROCEDURE — 3079F DIAST BP 80-89 MM HG: CPT | Performed by: STUDENT IN AN ORGANIZED HEALTH CARE EDUCATION/TRAINING PROGRAM

## 2025-06-09 PROCEDURE — 3077F SYST BP >= 140 MM HG: CPT | Performed by: STUDENT IN AN ORGANIZED HEALTH CARE EDUCATION/TRAINING PROGRAM

## 2025-06-09 RX ORDER — FERROUS SULFATE 325(65) MG
325 TABLET, DELAYED RELEASE (ENTERIC COATED) ORAL 2 TIMES DAILY WITH MEALS
Qty: 180 TABLET | Refills: 1 | Status: SHIPPED | OUTPATIENT
Start: 2025-06-09

## 2025-06-09 RX ORDER — HYDRALAZINE HYDROCHLORIDE 10 MG/1
10 TABLET, FILM COATED ORAL 2 TIMES DAILY
Qty: 180 TABLET | Refills: 0 | Status: SHIPPED | OUTPATIENT
Start: 2025-06-09

## 2025-06-09 RX ORDER — LISINOPRIL 40 MG/1
40 TABLET ORAL DAILY
Qty: 90 TABLET | Refills: 0 | Status: SHIPPED | OUTPATIENT
Start: 2025-06-09

## 2025-06-09 RX ORDER — HYDROCHLOROTHIAZIDE 25 MG/1
25 TABLET ORAL DAILY
Qty: 90 TABLET | Refills: 0 | Status: SHIPPED | OUTPATIENT
Start: 2025-06-09

## 2025-06-09 RX ORDER — SPIRONOLACTONE 25 MG/1
25 TABLET ORAL DAILY
Qty: 90 TABLET | Refills: 0 | Status: SHIPPED | OUTPATIENT
Start: 2025-06-09

## 2025-06-09 RX ORDER — ATORVASTATIN CALCIUM 20 MG/1
20 TABLET, FILM COATED ORAL DAILY
Qty: 90 TABLET | Refills: 0 | Status: SHIPPED | OUTPATIENT
Start: 2025-06-09

## 2025-06-09 RX ORDER — NEBIVOLOL 10 MG/1
10 TABLET ORAL DAILY
Qty: 90 TABLET | Refills: 0 | Status: SHIPPED | OUTPATIENT
Start: 2025-06-09

## 2025-06-09 SDOH — ECONOMIC STABILITY: FOOD INSECURITY: WITHIN THE PAST 12 MONTHS, THE FOOD YOU BOUGHT JUST DIDN'T LAST AND YOU DIDN'T HAVE MONEY TO GET MORE.: NEVER TRUE

## 2025-06-09 SDOH — ECONOMIC STABILITY: FOOD INSECURITY: WITHIN THE PAST 12 MONTHS, YOU WORRIED THAT YOUR FOOD WOULD RUN OUT BEFORE YOU GOT MONEY TO BUY MORE.: NEVER TRUE

## 2025-06-09 ASSESSMENT — ENCOUNTER SYMPTOMS
NAUSEA: 0
SHORTNESS OF BREATH: 0
COUGH: 0
ABDOMINAL PAIN: 0
RHINORRHEA: 0
VOMITING: 0

## 2025-06-09 NOTE — PROGRESS NOTES
dentified pt with two pt identifiers(name and ).    Chief Complaint   Patient presents with    Follow-up     Patient here for diabetes, hypertension & back pain.        Health Maintenance Due   Topic    Varicella vaccine (1 of 2 - 13+ 2-dose series)    HIV screen     Diabetic retinal exam     Hepatitis B vaccine (1 of 3 - 19+ 3-dose series)    DTaP/Tdap/Td vaccine (1 - Tdap)    Pneumococcal 0-49 years Vaccine (1 of 2 - PCV)    COVID-19 Vaccine ( -  season)    Diabetic foot exam        Wt Readings from Last 3 Encounters:   25 105.1 kg (231 lb 12.8 oz)   24 107 kg (236 lb)   24 108.9 kg (240 lb)     Temp Readings from Last 3 Encounters:   25 99 °F (37.2 °C) (Oral)   24 97.5 °F (36.4 °C) (Skin)     BP Readings from Last 3 Encounters:   25 137/87   24 (!) 150/90   24 (!) 182/97     Pulse Readings from Last 3 Encounters:   25 99   24 75   24 59           Coordination of Care Questionnaire:  :   1. \"Have you been to the ER, urgent care clinic since your last visit?  Hospitalized since your last visit?\" no    2. \"Have you seen or consulted any other health care providers outside of the [facility] since your last visit?\" no     3. For patients aged 45-75: Has the patient had a colonoscopy / FIT/ Cologuard? no      If the patient is female:    4. For patients aged 40-74: Has the patient had a mammogram within the past 2 years? no      5. For patients aged 21-65: Has the patient had a pap smear? no     3) Do you have an Advance Directive on file? no  Are you interested in receiving information about Advance Directives? no    Patient is accompanied by self I have received verbal consent from Sarah Wyman to discuss any/all medical information while they are present in the room.   
kidneys. She also denies any signs or symptoms of TEDDY.  She denies any daytime fatigue or snoring.  She also has a history of chronic low back pain and is intermittent.  This has previously been evaluated.  She states she only occasionally uses the Flexeril as needed and would like a refill today.  She also has been off of her oral iron recently.  Had previously been followed by hematology.  She does have a known history of heavy menstrual cycles.  She denies any other acute concerns.    Diabetes Follow-up:   ---------------------------------------------  Home glucose Readings - Does not regularly check    Hypoglycemia - No  Current Medications - Ozempic is cost prohibitive; Metformin 500 mg BID   Yearly eye exam - Advised to schedule routine diabetic eye exam   Last Foot Exam - Completed 1/2024 - normal  Microalbumin/Cr - Completed 1/2024 - elevated   Statin Therapy - Atorvastatin 20 mg daily   ACEi/ARB - Lisinopril 40 mg daily           ROS:   Review of Systems   Constitutional:  Negative for chills and fever.   HENT:  Negative for rhinorrhea.    Respiratory:  Negative for cough and shortness of breath.    Cardiovascular:  Negative for chest pain and leg swelling.   Gastrointestinal:  Negative for abdominal pain, nausea and vomiting.   Neurological:  Negative for dizziness, weakness, numbness and headaches.         Objective:     Vitals:    06/09/25 1337   BP: (!) 143/87   Pulse:    Resp:    Temp:    SpO2:       BP (!) 143/87   Pulse 85   Temp 97.8 °F (36.6 °C) (Temporal)   Resp 17   Ht 1.626 m (5' 4\")   Wt 103.8 kg (228 lb 12.8 oz)   SpO2 100%   BMI 39.27 kg/m²       Vitals and Nurse Documentation reviewed.     Physical Exam  Constitutional:       General: She is not in acute distress.     Appearance: Normal appearance. She is obese. She is not ill-appearing.   HENT:      Head: Normocephalic and atraumatic.   Eyes:      Conjunctiva/sclera: Conjunctivae normal.   Cardiovascular:      Rate and Rhythm: Normal

## 2025-06-10 LAB
ALBUMIN SERPL-MCNC: 3.4 G/DL (ref 3.5–5)
ALBUMIN/GLOB SERPL: 0.8 (ref 1.1–2.2)
ALP SERPL-CCNC: 93 U/L (ref 45–117)
ALT SERPL-CCNC: 17 U/L (ref 12–78)
ANION GAP SERPL CALC-SCNC: 7 MMOL/L (ref 2–12)
AST SERPL-CCNC: 21 U/L (ref 15–37)
BASOPHILS # BLD: 0.04 K/UL (ref 0–0.1)
BASOPHILS NFR BLD: 0.4 % (ref 0–1)
BILIRUB SERPL-MCNC: 0.2 MG/DL (ref 0.2–1)
BUN SERPL-MCNC: 19 MG/DL (ref 6–20)
BUN/CREAT SERPL: 22 (ref 12–20)
CALCIUM SERPL-MCNC: 9.2 MG/DL (ref 8.5–10.1)
CHLORIDE SERPL-SCNC: 104 MMOL/L (ref 97–108)
CHOLEST SERPL-MCNC: 148 MG/DL
CO2 SERPL-SCNC: 25 MMOL/L (ref 21–32)
CREAT SERPL-MCNC: 0.85 MG/DL (ref 0.55–1.02)
CREAT UR-MCNC: 64.7 MG/DL
DIFFERENTIAL METHOD BLD: ABNORMAL
EOSINOPHIL # BLD: 0.35 K/UL (ref 0–0.4)
EOSINOPHIL NFR BLD: 3.4 % (ref 0–7)
ERYTHROCYTE [DISTWIDTH] IN BLOOD BY AUTOMATED COUNT: 17.5 % (ref 11.5–14.5)
EST. AVERAGE GLUCOSE BLD GHB EST-MCNC: 131 MG/DL
GLOBULIN SER CALC-MCNC: 4.5 G/DL (ref 2–4)
GLUCOSE SERPL-MCNC: 144 MG/DL (ref 65–100)
HBA1C MFR BLD: 6.2 % (ref 4–5.6)
HCT VFR BLD AUTO: 29.5 % (ref 35–47)
HDLC SERPL-MCNC: 43 MG/DL
HDLC SERPL: 3.4 (ref 0–5)
HGB BLD-MCNC: 8.8 G/DL (ref 11.5–16)
IMM GRANULOCYTES # BLD AUTO: 0.03 K/UL (ref 0–0.04)
IMM GRANULOCYTES NFR BLD AUTO: 0.3 % (ref 0–0.5)
LDLC SERPL CALC-MCNC: 64.6 MG/DL (ref 0–100)
LYMPHOCYTES # BLD: 2.26 K/UL (ref 0.8–3.5)
LYMPHOCYTES NFR BLD: 21.9 % (ref 12–49)
MCH RBC QN AUTO: 23.3 PG (ref 26–34)
MCHC RBC AUTO-ENTMCNC: 29.8 G/DL (ref 30–36.5)
MCV RBC AUTO: 78 FL (ref 80–99)
MICROALBUMIN UR-MCNC: 0.64 MG/DL
MICROALBUMIN/CREAT UR-RTO: 10 MG/G (ref 0–30)
MONOCYTES # BLD: 0.61 K/UL (ref 0–1)
MONOCYTES NFR BLD: 5.9 % (ref 5–13)
NEUTS SEG # BLD: 7.02 K/UL (ref 1.8–8)
NEUTS SEG NFR BLD: 68.1 % (ref 32–75)
NRBC # BLD: 0 K/UL (ref 0–0.01)
NRBC BLD-RTO: 0 PER 100 WBC
PLATELET # BLD AUTO: 498 K/UL (ref 150–400)
PMV BLD AUTO: 10.1 FL (ref 8.9–12.9)
POTASSIUM SERPL-SCNC: 4.1 MMOL/L (ref 3.5–5.1)
PROT SERPL-MCNC: 7.9 G/DL (ref 6.4–8.2)
RBC # BLD AUTO: 3.78 M/UL (ref 3.8–5.2)
SODIUM SERPL-SCNC: 136 MMOL/L (ref 136–145)
SPECIMEN HOLD: NORMAL
TRIGL SERPL-MCNC: 202 MG/DL
TSH SERPL DL<=0.05 MIU/L-ACNC: 0.59 UIU/ML (ref 0.36–3.74)
VLDLC SERPL CALC-MCNC: 40.4 MG/DL
WBC # BLD AUTO: 10.3 K/UL (ref 3.6–11)

## 2025-06-10 RX ORDER — CYCLOBENZAPRINE HCL 10 MG
10 TABLET ORAL EVERY 8 HOURS PRN
Qty: 30 TABLET | Refills: 1 | Status: SHIPPED | OUTPATIENT
Start: 2025-06-10

## 2025-06-12 ENCOUNTER — HOSPITAL ENCOUNTER (OUTPATIENT)
Facility: HOSPITAL | Age: 45
Setting detail: RECURRING SERIES
Discharge: HOME | End: 2025-06-15
Payer: COMMERCIAL

## 2025-06-12 PROCEDURE — 97162 PT EVAL MOD COMPLEX 30 MIN: CPT | Performed by: PHYSICAL THERAPIST

## 2025-06-12 NOTE — THERAPY EVALUATION
Physical Therapy at [place],   a part of [place] [id number] [facility], [address]  [place] [address]  Phone: [phone]  Fax: [phone]       PHYSICAL THERAPY - EVALUATION/PLAN OF CARE NOTE (updated 3/23)      Date: 2025          Patient Name:  Sarah Wyman :  1980   Medical   Diagnosis:  No admission diagnoses are documented for this encounter. Treatment Diagnosis:  M54.59, G89.29  CHRONIC LOWER BACK PAIN    Referral Source:  Porsha Griffin MD Provider #:  [id number]                Insurance: Payor: [id number] [facility] / Plan: [employer] [place] [facility] / Product Type: *No Product type* /      Patient  verified yes     Visit #   Current  / Total 1 24   Time   In / Out 10:15 AM 10:50 AM   Total Treatment Time 35   Total Timed Codes 0         SUBJECTIVE  If an interpreting service was utilized for treatment of this patient, the contents of this document represent the material reviewed with the patient via the .     Pain Level (0-10 scale): 2  [x]constant []intermittent []improving []worsening []no change since onset    Any medication changes, allergies to medications, adverse drug reactions, diagnosis change, or new procedure performed?: [x] No    [] Yes (see summary sheet for update)  Medications: Verified on Patient Summary List    Subjective functional status/changes:     Pt presents to [facility] c/o lower right sided back p! With movement. Pt states p! With any motion to the right, sometimes creeping up to the upper right back, and this affects her ability to walk for more than 20 minutes as well as drive. Pt takes p! Medication at times before sleep as p! Disrupts her sleeping patterns. Laying down eases symptoms. Has not tried ice nor heat and this is the pts first time in [facility].      Start of Care: 2025  Onset Date: One year ago  Current symptoms/Complaints: LBP during movement   Mechanism of Injury: Idiopathic

## 2025-06-13 ENCOUNTER — RESULTS FOLLOW-UP (OUTPATIENT)
Facility: CLINIC | Age: 45
End: 2025-06-13

## 2025-06-16 ENCOUNTER — HOSPITAL ENCOUNTER (OUTPATIENT)
Facility: HOSPITAL | Age: 45
Setting detail: RECURRING SERIES
Discharge: HOME | End: 2025-06-19
Payer: COMMERCIAL

## 2025-06-16 PROCEDURE — 97110 THERAPEUTIC EXERCISES: CPT | Performed by: PHYSICAL THERAPIST

## 2025-06-16 NOTE — PROGRESS NOTES
PHYSICAL THERAPY - DAILY TREATMENT NOTE (updated 3/23)      Date: 2025          Patient Name:  Sarah Wyman :  1980   Medical   Diagnosis:  No admission diagnoses are documented for this encounter. Treatment Diagnosis:  M54.59, G89.29  CHRONIC LOWER BACK PAIN    Referral Source:  Porsha Griffin MD Insurance:   Payor: [facility] / Plan: [facility] / Product Type: *No Product type* /                     Patient  verified yes     Visit #   Current  / Total 2 24   Time   In / Out 1:40 PM 2:25 PM   Total Treatment Time 45   Total Timed Codes 45         SUBJECTIVE  If an interpreting service was utilized for treatment of this patient, the contents of this document represent the material reviewed with the patient via the .     Pain Level (0-10 scale): 3/10    Any medication changes, allergies to medications, adverse drug reactions, diagnosis change, or new procedure performed?: [x] No    [] Yes (see summary sheet for update)  Medications: Verified on Patient Summary List    Subjective functional status/changes:     Pt states HEP is helping her muscles relax, once she begins to move again she starts getting some spasms. Doing better than before.     OBJECTIVE      Therapeutic Procedures:  Tx Min Billable or 1:1 Min (if diff from Tx Min) Procedure, Rationale, Specifics   45  40236 Therapeutic Exercise (timed):  increase ROM, strength, coordination, balance, and proprioception to improve patient's ability to progress to PLOF and address remaining functional goals. (see flow sheet as applicable)     Details if applicable:            Details if applicable:           Details if applicable:           Details if applicable:            Details if applicable:     45     Total Total       [x]  Patient Education billed concurrently with other procedures   [x] Review HEP    [] Progressed/Changed HEP, detail:    [] Other detail:         Other Objective/Functional

## 2025-07-01 ENCOUNTER — HOSPITAL ENCOUNTER (OUTPATIENT)
Facility: HOSPITAL | Age: 45
Setting detail: RECURRING SERIES
Discharge: HOME | End: 2025-07-04
Payer: COMMERCIAL

## 2025-07-01 PROCEDURE — 97110 THERAPEUTIC EXERCISES: CPT

## 2025-07-01 PROCEDURE — 97140 MANUAL THERAPY 1/> REGIONS: CPT

## 2025-07-01 NOTE — PROGRESS NOTES
posterior rotated innominate, TPR to R QL           Details if applicable:           Details if applicable:            Details if applicable:     45     Total Total     Modalities Rationale:     decrease inflammation, decrease pain, and increase tissue extensibility to improve patient's ability to progress to PLOF and address remaining functional goals.       min [] Estim Unattended,             type/location:       []  w/ice    []  w/heat        min [] Estim Attended,             type/location:       []  w/ice   []  w/heat         []  w/US   []  TENS insruct            min []  Mechanical Traction,        type/lbs:        []  pro      []  sup           []  int       []  cont            []  before manual           []  after manual     min []  Ultrasound,         settings/location:     10 min  unbilled []  Ice     [x]  Heat            location/position: back, supine         min []  Vasopneumatic Device,      press/temp:   pre-treatment girth :    post-treatment girth :    measured at (landmark       location) :   If using vaso (only need to measure limb vaso being performed on)        min []  Other:        Skin assessment post-treatment (if applicable):    [x]  intact    []  redness- no adverse reaction                 []redness - adverse reaction:             [x]  Patient Education billed concurrently with other procedures   [x] Review HEP    [] Progressed/Changed HEP, detail:    [] Other detail:         Other Objective/Functional Measures  Significant reduction in pain following manual therapy    Pain Level at end of session (0-10 scale): 2      Assessment     Patient will continue to benefit from skilled PT / OT services to modify and progress therapeutic interventions, analyze and address functional mobility deficits, analyze and address ROM deficits, analyze and address strength deficits, and analyze and cue for proper movement patterns to address functional deficits and attain remaining goals.    Progress toward

## 2025-07-02 ENCOUNTER — OFFICE VISIT (OUTPATIENT)
Facility: CLINIC | Age: 45
End: 2025-07-02
Payer: COMMERCIAL

## 2025-07-02 VITALS
HEART RATE: 69 BPM | BODY MASS INDEX: 38.68 KG/M2 | SYSTOLIC BLOOD PRESSURE: 131 MMHG | DIASTOLIC BLOOD PRESSURE: 82 MMHG | OXYGEN SATURATION: 99 % | WEIGHT: 226.6 LBS | HEIGHT: 64 IN | RESPIRATION RATE: 17 BRPM | TEMPERATURE: 98.6 F

## 2025-07-02 DIAGNOSIS — I10 ESSENTIAL (PRIMARY) HYPERTENSION: Primary | ICD-10-CM

## 2025-07-02 DIAGNOSIS — D50.9 IRON DEFICIENCY ANEMIA, UNSPECIFIED IRON DEFICIENCY ANEMIA TYPE: ICD-10-CM

## 2025-07-02 PROCEDURE — 99214 OFFICE O/P EST MOD 30 MIN: CPT | Performed by: STUDENT IN AN ORGANIZED HEALTH CARE EDUCATION/TRAINING PROGRAM

## 2025-07-02 PROCEDURE — 3075F SYST BP GE 130 - 139MM HG: CPT | Performed by: STUDENT IN AN ORGANIZED HEALTH CARE EDUCATION/TRAINING PROGRAM

## 2025-07-02 PROCEDURE — 3079F DIAST BP 80-89 MM HG: CPT | Performed by: STUDENT IN AN ORGANIZED HEALTH CARE EDUCATION/TRAINING PROGRAM

## 2025-07-02 ASSESSMENT — ENCOUNTER SYMPTOMS
RHINORRHEA: 0
VOMITING: 0
NAUSEA: 0
ABDOMINAL PAIN: 0
COUGH: 0
SHORTNESS OF BREATH: 0

## 2025-07-02 NOTE — PROGRESS NOTES
dentified pt with two pt identifiers(name and ).    Chief Complaint   Patient presents with    1 Month Follow-Up     Patient here for diabetes & hypertension follow up.        Health Maintenance Due   Topic    Varicella vaccine (1 of 2 - 13+ 2-dose series)    HIV screen     Diabetic retinal exam     Hepatitis B vaccine (1 of 3 - 19+ 3-dose series)    DTaP/Tdap/Td vaccine (1 - Tdap)    Pneumococcal 0-49 years Vaccine (1 of 2 - PCV)    COVID-19 Vaccine ( season)    Diabetic foot exam        Wt Readings from Last 3 Encounters:   25 102.8 kg (226 lb 9.6 oz)   25 103.8 kg (228 lb 12.8 oz)   25 105.1 kg (231 lb 12.8 oz)     Temp Readings from Last 3 Encounters:   25 97.8 °F (36.6 °C) (Temporal)   25 99 °F (37.2 °C) (Oral)     BP Readings from Last 3 Encounters:   25 (!) 143/87   25 137/87   24 (!) 150/90     Pulse Readings from Last 3 Encounters:   25 85   25 99   24 75           Coordination of Care Questionnaire:  :   1. \"Have you been to the ER, urgent care clinic since your last visit?  Hospitalized since your last visit?\" no    2. \"Have you seen or consulted any other health care providers outside of the [facility] since your last visit?\" no     3. For patients aged 45-75: Has the patient had a colonoscopy / FIT/ Cologuard? no      If the patient is female:    4. For patients aged 40-74: Has the patient had a mammogram within the past 2 years? no      5. For patients aged 21-65: Has the patient had a pap smear? no     3) Do you have an Advance Directive on file? no  Are you interested in receiving information about Advance Directives? no    Patient is accompanied by self I have received verbal consent from Sarah Wyman to discuss any/all medical information while they are present in the room.   
Negative for rhinorrhea.    Respiratory:  Negative for cough and shortness of breath.    Cardiovascular:  Negative for chest pain and leg swelling.   Gastrointestinal:  Negative for abdominal pain, nausea and vomiting.   Neurological:  Negative for dizziness, weakness, numbness and headaches.         Objective:     Vitals:    07/02/25 0932   BP: 131/82   Pulse: 69   Resp: 17   Temp: 98.6 °F (37 °C)   SpO2: 99%          Vitals and Nurse Documentation reviewed.     Physical Exam  Constitutional:       General: She is not in acute distress.     Appearance: Normal appearance. She is obese. She is not ill-appearing.   HENT:      Head: Normocephalic and atraumatic.   Eyes:      Conjunctiva/sclera: Conjunctivae normal.   Cardiovascular:      Rate and Rhythm: Normal rate and regular rhythm.      Heart sounds: Normal heart sounds. No murmur heard.     No friction rub. No gallop.   Pulmonary:      Effort: Pulmonary effort is normal. No respiratory distress.      Breath sounds: Normal breath sounds. No wheezing, rhonchi or rales.   Abdominal:      General: Bowel sounds are normal. There is no distension.      Palpations: Abdomen is soft.      Tenderness: There is no abdominal tenderness. There is no guarding or rebound.   Musculoskeletal:      Cervical back: Neck supple.      Right lower leg: No edema.      Left lower leg: No edema.   Neurological:      Mental Status: She is alert and oriented to person, place, and time.      Gait: Gait normal.         No results found for this visit on 07/02/25.

## 2025-07-03 ENCOUNTER — HOSPITAL ENCOUNTER (OUTPATIENT)
Facility: HOSPITAL | Age: 45
Setting detail: RECURRING SERIES
Discharge: HOME | End: 2025-07-06
Payer: COMMERCIAL

## 2025-07-03 PROCEDURE — 97110 THERAPEUTIC EXERCISES: CPT

## 2025-07-03 NOTE — PROGRESS NOTES
PHYSICAL THERAPY - DAILY TREATMENT NOTE (updated 3/23)      Date: 7/3/2025          Patient Name:  Sarah Wyman :  1980   Medical   Diagnosis:  Other low back pain [M54.59] Treatment Diagnosis:  M54.59, G89.29  CHRONIC LOWER BACK PAIN    Referral Source:  Porsha Griffin MD Insurance:   Payor: [facility] / Plan: [facility] [facility] / Product Type: *No Product type* /                     Patient  verified yes     Visit #   Current  / Total 4 24   Time   In / Out 915a 1010a   Total Treatment Time 55   Total Timed Codes 45         SUBJECTIVE  If an interpreting service was utilized for treatment of this patient, the contents of this document represent the material reviewed with the patient via the .     Pain Level (0-10 scale): 4/10    Any medication changes, allergies to medications, adverse drug reactions, diagnosis change, or new procedure performed?: [x] No    [] Yes (see summary sheet for update)  Medications: Verified on Patient Summary List    Subjective functional status/changes:     Patient reported the pain is much better since last visit.    OBJECTIVE      Therapeutic Procedures:  Tx Min Billable or 1:1 Min (if diff from Tx Min) Procedure, Rationale, Specifics   45  [id number] Therapeutic Exercise (timed):  increase ROM, strength, coordination, balance, and proprioception to improve patient's ability to progress to PLOF and address remaining functional goals. (see flow sheet as applicable)     Details if applicable:       96597 Manual Therapy (timed):  decrease pain, increase ROM, and increase tissue extensibility to improve patient's ability to progress to PLOF and address remaining functional goals.  The manual therapy interventions were performed at a separate and distinct time from the therapeutic activities interventions . (see flow sheet as applicable)    Details if applicable:  MET to correct R posterior rotated innominate, TPR to R QL           Details

## 2025-07-31 ENCOUNTER — APPOINTMENT (OUTPATIENT)
Facility: HOSPITAL | Age: 45
End: 2025-07-31
Payer: COMMERCIAL

## 2025-08-11 ENCOUNTER — OFFICE VISIT (OUTPATIENT)
Facility: CLINIC | Age: 45
End: 2025-08-11
Payer: COMMERCIAL

## 2025-08-11 VITALS
SYSTOLIC BLOOD PRESSURE: 130 MMHG | HEIGHT: 64 IN | DIASTOLIC BLOOD PRESSURE: 98 MMHG | RESPIRATION RATE: 17 BRPM | OXYGEN SATURATION: 99 % | HEART RATE: 80 BPM | TEMPERATURE: 98 F | WEIGHT: 234.2 LBS | BODY MASS INDEX: 39.98 KG/M2

## 2025-08-11 DIAGNOSIS — D50.9 IRON DEFICIENCY ANEMIA, UNSPECIFIED IRON DEFICIENCY ANEMIA TYPE: ICD-10-CM

## 2025-08-11 DIAGNOSIS — R77.1 ELEVATED SERUM GLOBULIN LEVEL: ICD-10-CM

## 2025-08-11 DIAGNOSIS — I10 ESSENTIAL (PRIMARY) HYPERTENSION: Primary | ICD-10-CM

## 2025-08-11 PROCEDURE — 3080F DIAST BP >= 90 MM HG: CPT | Performed by: STUDENT IN AN ORGANIZED HEALTH CARE EDUCATION/TRAINING PROGRAM

## 2025-08-11 PROCEDURE — 99214 OFFICE O/P EST MOD 30 MIN: CPT | Performed by: STUDENT IN AN ORGANIZED HEALTH CARE EDUCATION/TRAINING PROGRAM

## 2025-08-11 PROCEDURE — 3075F SYST BP GE 130 - 139MM HG: CPT | Performed by: STUDENT IN AN ORGANIZED HEALTH CARE EDUCATION/TRAINING PROGRAM

## 2025-08-11 ASSESSMENT — ENCOUNTER SYMPTOMS
COUGH: 0
ABDOMINAL PAIN: 0
SHORTNESS OF BREATH: 0
RHINORRHEA: 0
NAUSEA: 0
VOMITING: 0

## 2025-08-12 LAB
ANION GAP SERPL CALC-SCNC: 10 MMOL/L (ref 2–14)
BUN SERPL-MCNC: 15 MG/DL (ref 6–20)
BUN/CREAT SERPL: 17 (ref 12–20)
CALCIUM SERPL-MCNC: 9.1 MG/DL (ref 8.6–10)
CHLORIDE SERPL-SCNC: 100 MMOL/L (ref 98–107)
CO2 SERPL-SCNC: 25 MMOL/L (ref 20–29)
CREAT SERPL-MCNC: 0.91 MG/DL (ref 0.6–1)
ERYTHROCYTE [DISTWIDTH] IN BLOOD BY AUTOMATED COUNT: 16.7 % (ref 11.5–14.5)
GLUCOSE SERPL-MCNC: 154 MG/DL (ref 65–100)
HCT VFR BLD AUTO: 29.3 % (ref 35–47)
HGB BLD-MCNC: 9.1 G/DL (ref 11.5–16)
IRON SATN MFR SERPL: 6 %
IRON SERPL-MCNC: 26 UG/DL (ref 37–145)
MCH RBC QN AUTO: 24.3 PG (ref 26–34)
MCHC RBC AUTO-ENTMCNC: 31.1 G/DL (ref 30–36.5)
MCV RBC AUTO: 78.3 FL (ref 80–99)
NRBC # BLD: 0 K/UL (ref 0–0.01)
NRBC BLD-RTO: 0 PER 100 WBC
PLATELET # BLD AUTO: 467 K/UL (ref 150–400)
PMV BLD AUTO: 9.6 FL (ref 8.9–12.9)
POTASSIUM SERPL-SCNC: 4.5 MMOL/L (ref 3.5–5.1)
RBC # BLD AUTO: 3.74 M/UL (ref 3.8–5.2)
SODIUM SERPL-SCNC: 135 MMOL/L (ref 136–145)
TIBC SERPL-MCNC: 441 UG/DL (ref 250–450)
UIBC SERPL-MCNC: 415 UG/DL (ref 112–347)
WBC # BLD AUTO: 7.6 K/UL (ref 3.6–11)

## 2025-08-13 LAB
ALBUMIN SERPL ELPH-MCNC: 3.3 G/DL (ref 2.9–4.4)
ALBUMIN/GLOB SERPL: 0.9 (ref 0.7–1.7)
ALPHA1 GLOB SERPL ELPH-MCNC: 0.2 G/DL (ref 0–0.4)
ALPHA2 GLOB SERPL ELPH-MCNC: 0.8 G/DL (ref 0.4–1)
B-GLOBULIN SERPL ELPH-MCNC: 1 G/DL (ref 0.7–1.3)
GAMMA GLOB SERPL ELPH-MCNC: 1.7 G/DL (ref 0.4–1.8)
GLOBULIN SER CALC-MCNC: 3.7 G/DL (ref 2.2–3.9)
M PROTEIN SERPL ELPH-MCNC: NORMAL G/DL
PROT SERPL-MCNC: 7 G/DL (ref 6–8.5)

## 2025-08-14 LAB
ALBUMIN MFR UR ELPH: 39.9 %
ALPHA1 GLOB MFR UR ELPH: 1.2 %
ALPHA2 GLOB 24H MFR UR ELPH: 10.2 %
B-GLOBULIN 24H MFR UR ELPH: 26.5 %
GAMMA GLOB 24H MFR UR ELPH: 22.2 %
LABORATORY COMMENT REPORT: NORMAL
M PROTEIN MFR UR ELPH: NORMAL %
PROT UR-MCNC: 17.2 MG/DL

## 2025-08-18 ENCOUNTER — RESULTS FOLLOW-UP (OUTPATIENT)
Facility: CLINIC | Age: 45
End: 2025-08-18